# Patient Record
Sex: FEMALE | Race: WHITE | NOT HISPANIC OR LATINO | Employment: FULL TIME | URBAN - METROPOLITAN AREA
[De-identification: names, ages, dates, MRNs, and addresses within clinical notes are randomized per-mention and may not be internally consistent; named-entity substitution may affect disease eponyms.]

---

## 2018-07-31 ENCOUNTER — HOSPITAL ENCOUNTER (INPATIENT)
Facility: HOSPITAL | Age: 28
LOS: 3 days | Discharge: HOME | DRG: 564 | End: 2018-08-03
Attending: EMERGENCY MEDICINE | Admitting: INTERNAL MEDICINE
Payer: COMMERCIAL

## 2018-07-31 ENCOUNTER — APPOINTMENT (EMERGENCY)
Dept: ULTRASOUND IMAGING | Facility: HOSPITAL | Age: 28
DRG: 564 | End: 2018-07-31
Payer: COMMERCIAL

## 2018-07-31 ENCOUNTER — HOSPITAL ENCOUNTER (EMERGENCY)
Facility: HOSPITAL | Age: 28
Discharge: HOME | DRG: 564 | End: 2018-07-31
Attending: EMERGENCY MEDICINE
Payer: COMMERCIAL

## 2018-07-31 VITALS
HEART RATE: 101 BPM | WEIGHT: 138.67 LBS | HEIGHT: 64 IN | TEMPERATURE: 98.4 F | BODY MASS INDEX: 23.67 KG/M2 | RESPIRATION RATE: 18 BRPM | OXYGEN SATURATION: 99 % | SYSTOLIC BLOOD PRESSURE: 121 MMHG | DIASTOLIC BLOOD PRESSURE: 70 MMHG

## 2018-07-31 DIAGNOSIS — E11.9 DIABETES MELLITUS, NEW ONSET (HCC): Primary | ICD-10-CM

## 2018-07-31 DIAGNOSIS — O03.9 MISCARRIAGE: ICD-10-CM

## 2018-07-31 DIAGNOSIS — O46.90 VAGINAL BLEEDING IN PREGNANCY: Primary | ICD-10-CM

## 2018-07-31 DIAGNOSIS — G43.909 MIGRAINE: ICD-10-CM

## 2018-07-31 LAB
ABO GROUP BLD: NORMAL
ACETONE SERPL-MCNC: NEGATIVE MG/DL
ANION GAP SERPL CALCULATED.3IONS-SCNC: 9 MMOL/L (ref 4–13)
B-HCG SERPL-ACNC: 35 MIU/ML
BACTERIA UR QL AUTO: ABNORMAL /HPF
BASOPHILS # BLD AUTO: 0.06 THOUSANDS/ΜL (ref 0–0.1)
BASOPHILS NFR BLD AUTO: 1 % (ref 0–1)
BILIRUB UR QL STRIP: NEGATIVE
BLD GP AB SCN SERPL QL: NEGATIVE
BUN SERPL-MCNC: 11 MG/DL (ref 5–25)
CALCIUM SERPL-MCNC: 8.8 MG/DL (ref 8.3–10.1)
CHLORIDE SERPL-SCNC: 94 MMOL/L (ref 100–108)
CLARITY UR: ABNORMAL
CO2 SERPL-SCNC: 25 MMOL/L (ref 21–32)
COLOR UR: YELLOW
CREAT SERPL-MCNC: 0.9 MG/DL (ref 0.6–1.3)
EOSINOPHIL # BLD AUTO: 0.39 THOUSAND/ΜL (ref 0–0.61)
EOSINOPHIL NFR BLD AUTO: 4 % (ref 0–6)
ERYTHROCYTE [DISTWIDTH] IN BLOOD BY AUTOMATED COUNT: 12 % (ref 11.6–15.1)
GFR SERPL CREATININE-BSD FRML MDRD: 89 ML/MIN/1.73SQ M
GLUCOSE SERPL-MCNC: 627 MG/DL (ref 65–140)
GLUCOSE UR STRIP-MCNC: ABNORMAL MG/DL
HCT VFR BLD AUTO: 36.6 % (ref 34.8–46.1)
HGB BLD-MCNC: 12.5 G/DL (ref 11.5–15.4)
HGB UR QL STRIP.AUTO: ABNORMAL
IMM GRANULOCYTES # BLD AUTO: 0.04 THOUSAND/UL (ref 0–0.2)
IMM GRANULOCYTES NFR BLD AUTO: 0 % (ref 0–2)
KETONES UR STRIP-MCNC: NEGATIVE MG/DL
LEUKOCYTE ESTERASE UR QL STRIP: ABNORMAL
LYMPHOCYTES # BLD AUTO: 3.81 THOUSANDS/ΜL (ref 0.6–4.47)
LYMPHOCYTES NFR BLD AUTO: 41 % (ref 14–44)
MCH RBC QN AUTO: 30 PG (ref 26.8–34.3)
MCHC RBC AUTO-ENTMCNC: 34.2 G/DL (ref 31.4–37.4)
MCV RBC AUTO: 88 FL (ref 82–98)
MONOCYTES # BLD AUTO: 0.74 THOUSAND/ΜL (ref 0.17–1.22)
MONOCYTES NFR BLD AUTO: 8 % (ref 4–12)
NEUTROPHILS # BLD AUTO: 4.24 THOUSANDS/ΜL (ref 1.85–7.62)
NEUTS SEG NFR BLD AUTO: 46 % (ref 43–75)
NITRITE UR QL STRIP: NEGATIVE
NON-SQ EPI CELLS URNS QL MICRO: ABNORMAL /HPF
NRBC BLD AUTO-RTO: 0 /100 WBCS
PH UR STRIP.AUTO: 6 [PH] (ref 4.5–8)
PLATELET # BLD AUTO: 357 THOUSANDS/UL (ref 149–390)
PMV BLD AUTO: 9.6 FL (ref 8.9–12.7)
POTASSIUM SERPL-SCNC: 3.8 MMOL/L (ref 3.5–5.3)
PROT UR STRIP-MCNC: NEGATIVE MG/DL
RBC # BLD AUTO: 4.16 MILLION/UL (ref 3.81–5.12)
RBC #/AREA URNS AUTO: ABNORMAL /HPF
RH BLD: POSITIVE
SODIUM SERPL-SCNC: 128 MMOL/L (ref 136–145)
SP GR UR STRIP.AUTO: <=1.005 (ref 1–1.03)
SPECIMEN EXPIRATION DATE: NORMAL
UROBILINOGEN UR QL STRIP.AUTO: 0.2 E.U./DL
WBC # BLD AUTO: 9.28 THOUSAND/UL (ref 4.31–10.16)
WBC #/AREA URNS AUTO: ABNORMAL /HPF

## 2018-07-31 PROCEDURE — 82009 KETONE BODYS QUAL: CPT | Performed by: EMERGENCY MEDICINE

## 2018-07-31 PROCEDURE — 76815 OB US LIMITED FETUS(S): CPT

## 2018-07-31 PROCEDURE — 84300 ASSAY OF URINE SODIUM: CPT | Performed by: INTERNAL MEDICINE

## 2018-07-31 PROCEDURE — 80048 BASIC METABOLIC PNL TOTAL CA: CPT | Performed by: EMERGENCY MEDICINE

## 2018-07-31 PROCEDURE — 96360 HYDRATION IV INFUSION INIT: CPT

## 2018-07-31 PROCEDURE — 81001 URINALYSIS AUTO W/SCOPE: CPT | Performed by: EMERGENCY MEDICINE

## 2018-07-31 PROCEDURE — 99284 EMERGENCY DEPT VISIT MOD MDM: CPT

## 2018-07-31 PROCEDURE — 84702 CHORIONIC GONADOTROPIN TEST: CPT | Performed by: EMERGENCY MEDICINE

## 2018-07-31 PROCEDURE — 86850 RBC ANTIBODY SCREEN: CPT | Performed by: EMERGENCY MEDICINE

## 2018-07-31 PROCEDURE — 36415 COLL VENOUS BLD VENIPUNCTURE: CPT | Performed by: EMERGENCY MEDICINE

## 2018-07-31 PROCEDURE — 86901 BLOOD TYPING SEROLOGIC RH(D): CPT | Performed by: EMERGENCY MEDICINE

## 2018-07-31 PROCEDURE — 85025 COMPLETE CBC W/AUTO DIFF WBC: CPT | Performed by: EMERGENCY MEDICINE

## 2018-07-31 PROCEDURE — 83036 HEMOGLOBIN GLYCOSYLATED A1C: CPT | Performed by: EMERGENCY MEDICINE

## 2018-07-31 PROCEDURE — 86900 BLOOD TYPING SEROLOGIC ABO: CPT | Performed by: EMERGENCY MEDICINE

## 2018-07-31 PROCEDURE — 83935 ASSAY OF URINE OSMOLALITY: CPT | Performed by: INTERNAL MEDICINE

## 2018-07-31 RX ORDER — BUTALBITAL, ACETAMINOPHEN AND CAFFEINE 50; 325; 40 MG/1; MG/1; MG/1
1 TABLET ORAL ONCE
Status: COMPLETED | OUTPATIENT
Start: 2018-07-31 | End: 2018-07-31

## 2018-07-31 RX ADMIN — SODIUM CHLORIDE 1000 ML: 0.9 INJECTION, SOLUTION INTRAVENOUS at 23:09

## 2018-07-31 RX ADMIN — BUTALBITAL, ACETAMINOPHEN, AND CAFFEINE 1 TABLET: 50; 325; 40 TABLET ORAL at 23:41

## 2018-07-31 NOTE — ED PROVIDER NOTES
History  Chief Complaint   Patient presents with    Vaginal Bleeding     Pt  states she is 7 weeks pregnant and noticed bleeding and abdominal cramping since yesterday  pt states her last pregnancy she miscarried at 16 weeks     HPI  73-year-old  approximately 7 weeks pregnant by LMP with history of PCOS and metabolic syndrome presents to the emergency department with complaint of vaginal bleeding  Patient states that she began having lower abdominal cramping and vaginal bleeding last night  Symptoms continued throughout the day today and patient now reports bleeding as heavy as a typical menstrual period with associated abdominal cramping  Patient reports having had many positive pregnancy tests since last week  She has a first pregnancy appointment with an OB next week, but has not yet had a confirmed IUP  Of note, patient did have a miscarriage in May 2017 at 12 weeks  She did reports that she was able to pass the pregnancy during the miscarriage without intervention  On review of systems, she complains of recent nausea and vomiting over the past few weeks, but denies any fever, chills, chest pain, shortness of breath, or complaints other than stated above  None       Past Medical History:   Diagnosis Date    Asthma     Diabetes mellitus ([facility] [address] )     Metabolic syndrome     PCOS (polycystic ovarian syndrome)        History reviewed  No pertinent surgical history  History reviewed  No pertinent family history  I have reviewed and agree with the history as documented  Social History   Substance Use Topics    Smoking status: Never Smoker    Smokeless tobacco: Never Used    Alcohol use No        Review of Systems   Constitutional: Negative for chills and fever  Respiratory: Negative for shortness of breath  Gastrointestinal: Positive for abdominal pain  Negative for nausea and vomiting  Genitourinary: Positive for vaginal bleeding     Musculoskeletal: Negative for arthralgias and joint swelling  Skin: Negative for rash and wound  Allergic/Immunologic: Negative for immunocompromised state  Neurological: Negative for headaches  Psychiatric/Behavioral: The patient is not nervous/anxious  All other systems reviewed and are negative  Physical Exam  Physical Exam   Constitutional: She is oriented to person, place, and time  She appears well-nourished  No distress  HENT:   Head: Normocephalic and atraumatic  Eyes: EOM are normal    Neck: Normal range of motion  Neck supple  Cardiovascular: Normal rate and regular rhythm  Pulmonary/Chest: Effort normal and breath sounds normal  No respiratory distress  Abdominal: Soft  She exhibits no distension  Fluid wave: mild  There is tenderness in the suprapubic area  There is no rigidity and no guarding  Musculoskeletal: Normal range of motion  Neurological: She is alert and oriented to person, place, and time  Skin: Skin is warm and dry  She is not diaphoretic  Psychiatric: She has a normal mood and affect  Her behavior is normal    Nursing note and vitals reviewed        Vital Signs  ED Triage Vitals [07/31/18 1536]   Temperature Pulse Respirations Blood Pressure SpO2   98 4 °F (36 9 °C) (!) 107 16 125/80 99 %      Temp Source Heart Rate Source Patient Position - Orthostatic VS BP Location FiO2 (%)   Oral Monitor Sitting Left arm --      Pain Score       4           Vitals:    07/31/18 1536 07/31/18 1755   BP: 125/80 121/70   Pulse: (!) 107 101   Patient Position - Orthostatic VS: Sitting Sitting       Visual Acuity      ED Medications  Medications - No data to display    Diagnostic Studies  Results Reviewed     Procedure Component Value Units Date/Time    Basic metabolic panel [[id number]]  (Abnormal) Collected:  07/31/18 1623    Lab Status:  Final result Specimen:  Blood from Arm, Right Updated:  07/31/18 1934     Sodium 128 (L) mmol/L      Potassium 3 8 mmol/L      Chloride 94 (L) mmol/L      CO2 25 mmol/L      Anion Gap 9 mmol/L      BUN 11 mg/dL      Creatinine 0 90 mg/dL      Glucose 627 (HH) mg/dL      Calcium 8 8 mg/dL      eGFR 89 ml/min/1 73sq m     Narrative:         National Kidney Disease Education Program recommendations are as follows:  GFR calculation is accurate only with a steady state creatinine  Chronic Kidney disease less than 60 ml/min/1 73 sq  meters  Kidney failure less than 15 ml/min/1 73 sq  meters  Urinalysis with microscopic [[id number]]  (Abnormal) Collected:  07/31/18 1741    Lab Status:  Final result Specimen:  Urine from Urine, Clean Catch Updated:  07/31/18 1755     Clarity, UA Slightly Cloudy     Color, UA Yellow     Specific Gravity, UA <=1 005     pH, UA 6 0     Glucose, UA >=1000 (1%) (A) mg/dl      Ketones, UA Negative mg/dl      Blood, UA Large (A)     Protein, UA Negative mg/dl      Nitrite, UA Negative     Bilirubin, UA Negative     Urobilinogen, UA 0 2 E U /dl      Leukocytes, UA Elevated glucose may cause decreased leukocyte values   See urine microscopic for [facility] result/ (A)     WBC, UA None Seen /hpf      RBC, UA 30-50 (A) /hpf      Bacteria, UA Occasional /hpf      Epithelial Cells Occasional /hpf     hCG, quantitative [[id number]]  (Abnormal) Collected:  07/31/18 1623    Lab Status:  Final result Specimen:  Blood from Arm, Right Updated:  07/31/18 1652     HCG, Quant 35 (H) mIU/mL     Narrative:          Expected Ranges:     Approximate               Approximate HCG  Gestation age          Concentration ( mIU/mL)  _____________          ______________________   Brooks Nurse                      HCG values  0 2-1                       5-50  1-2                           2-3                         100-5000  3-4                         500-52012  4-5                         1000-82301  5-6                         [id number]  6-8                         [id number]  8-12                        [id number]                 US OB pregnancy limited with transvaginal   Final Result by Janet Nuñez MD ( 1712)      No intrauterine gestation or adnexal mass identified  Differential remains early IUP, spontaneous  and ectopic pregnancy  Short-term follow-up with serial beta-hCG and pelvic/OB ultrasound in 1 to 2 weeks  Workstation performed: [id number]                    Procedures  Procedures       Phone Contacts  ED Phone Contact    ED Course                               MDM  Number of Diagnoses or Management Options  Vaginal bleeding in pregnancy:   Diagnosis management comments: 80-year-old female with vaginal bleeding, recent positive pregnancy tests home, though no confirmed IUP  Will obtain hCG quant, type and screen, UA, and formal ultrasound, as no IUP visible on bedside ultrasound  Of note, patient discharged home after return of ultrasound revealing likely miscarriage  Patient's urine was sent to the lab just before discharge  Patient was called regarding abnormal UA with glucose in her urine  BMP was added on to blood already sent down to lab  Glucose noted to be over 600  Patient called back to the emergency department for further workup  (See next note)  CritCare Time    Disposition  Final diagnoses:   Vaginal bleeding in pregnancy     Time reflects when diagnosis was documented in both MDM as applicable and the Disposition within this note     Time User Action Codes Description Comment    2018  5:41 PM Demetra Kimball Add [O46 90] Vaginal bleeding in pregnancy       ED Disposition     ED Disposition Condition Comment    Discharge  Ellis Cook discharge to home/self care      Condition at discharge: Good        Follow-up Information     Follow up With Specialties Details Why Carlos Mann MD Maternal and Fetal Medicine, Obstetrics, Obstetrics and Gynecology, Gynecology Schedule an appointment as soon as possible for a visit  [address] [place] [address] [phone]      [place] [address] [facility] Obstetrics and Gynecology Schedule an appointment as soon as possible for a visit  [address] [place] [address]  [phone]          There are no discharge medications for this patient  Outpatient Discharge Orders  hCG, quantitative   Standing Status: Future  Standing Exp  Date: 07/31/19     Hemoglobin A1C   Standing Status: Future  Standing Exp  Date: 07/31/19     Basic metabolic panel   Standing Status: Future Number of Occurrences: 1 Standing Exp   Date: 07/31/19         ED Provider  Electronically Signed by           Caio Trejo MD  08/01/18 0670

## 2018-07-31 NOTE — DISCHARGE INSTRUCTIONS
Threatened Miscarriage   WHAT YOU NEED TO KNOW:   A threatened miscarriage occurs when you have vaginal bleeding within the first 20 weeks of pregnancy  It means that a miscarriage may happen  A threatened miscarriage may also be called a threatened   DISCHARGE INSTRUCTIONS:   Return to the emergency department if:   · You feel weak or faint  · Your pain or cramping in your abdomen or back gets worse  · You have vaginal bleeding that soaks 1 or more pads in an hour  · You pass material that looks like tissue or large clots  Contact your healthcare provider or obstetrician if:   · You have a fever  · You have trouble urinating, burning when you urinate, or feel a need to urinate often  · You have new or worsening vaginal bleeding  · You have vaginal pain or itching, or vaginal discharge that is yellow, green, or foul-smelling  · You have questions or concerns about your condition or care  Self-care: The following may help you manage your symptoms and decrease your risk for a miscarriage:  · Do not put anything in your vagina  Do not have sex, douche, or use tampons  These actions may increase your risk for infection and miscarriage  · Rest as directed  Do not exercise or do strenuous activities  These activities may cause  labor or miscarriage  Ask your healthcare provider what activities are okay to do  Stay healthy during pregnancy:   · Eat a variety of healthy foods  Healthy foods can help you get extra protein, water, and calories that you need while you are pregnant  Healthy foods include fruits, vegetables, whole-grain breads, low-fat dairy products, beans, lean meats, and fish  Avoid raw or undercooked meat and fish  Ask your healthcare provider if you need a special diet  · Take prenatal vitamins as directed  These help you get the right amount of vitamins and minerals  They may also decrease the risk of certain birth defects      · Do not drink alcohol or use illegal drugs  These can increase your risk for a miscarriage or harm your baby  · Do not smoke  Nicotine and other chemicals in cigarettes and cigars can harm your baby and cause miscarriage or  labor  Ask your healthcare provider for information if you currently smoke and need help to quit  E-cigarettes or smokeless tobacco still contain nicotine  Do not use these products  · Decrease your risk for an infection  Always wash your hands before eating or preparing meals  Do not spend time with people who are sick  Ask your healthcare provider if you need immunizations such as the flu or hepatitis B vaccine  Immunizations may decrease your risk for infections that could cause a miscarriage  · Manage your medical conditions  Keep your blood pressure and blood sugars under control  Maintain a healthy weight during pregnancy  Follow up with your obstetrician as directed: You may need to see your obstetrician frequently for ultrasounds or blood tests  Write down your questions so you remember to ask them during your visits  ©  26[address] [employer] Information is for End User's use only and may not be sold, redistributed or otherwise used for commercial purposes  All illustrations and images included in CareNotes® are the copyrighted property of [employer]  or Sorin Sadler  The above information is an  only  It is not intended as medical advice for individual conditions or treatments  Talk to your doctor, nurse or pharmacist before following any medical regimen to see if it is safe and effective for you

## 2018-08-01 PROBLEM — E11.9 DIABETES MELLITUS, NEW ONSET (HCC): Status: ACTIVE | Noted: 2018-08-01

## 2018-08-01 PROBLEM — O03.9 MISCARRIAGE: Status: ACTIVE | Noted: 2018-08-01

## 2018-08-01 PROBLEM — E87.1 HYPONATREMIA: Status: ACTIVE | Noted: 2018-08-01

## 2018-08-01 LAB
ALBUMIN SERPL BCP-MCNC: 3.1 G/DL (ref 3.5–5)
ALP SERPL-CCNC: 60 U/L (ref 46–116)
ALT SERPL W P-5'-P-CCNC: 11 U/L (ref 12–78)
ANION GAP SERPL CALCULATED.3IONS-SCNC: 6 MMOL/L (ref 4–13)
ANION GAP SERPL CALCULATED.3IONS-SCNC: 6 MMOL/L (ref 4–13)
AST SERPL W P-5'-P-CCNC: 5 U/L (ref 5–45)
BILIRUB SERPL-MCNC: 0.2 MG/DL (ref 0.2–1)
BUN SERPL-MCNC: 12 MG/DL (ref 5–25)
BUN SERPL-MCNC: 12 MG/DL (ref 5–25)
CALCIUM SERPL-MCNC: 8.7 MG/DL (ref 8.3–10.1)
CALCIUM SERPL-MCNC: 9 MG/DL (ref 8.3–10.1)
CHLORIDE SERPL-SCNC: 94 MMOL/L (ref 100–108)
CHLORIDE SERPL-SCNC: 99 MMOL/L (ref 100–108)
CHOLEST SERPL-MCNC: 168 MG/DL (ref 50–200)
CO2 SERPL-SCNC: 28 MMOL/L (ref 21–32)
CO2 SERPL-SCNC: 28 MMOL/L (ref 21–32)
CREAT SERPL-MCNC: 0.67 MG/DL (ref 0.6–1.3)
CREAT SERPL-MCNC: 0.76 MG/DL (ref 0.6–1.3)
ERYTHROCYTE [DISTWIDTH] IN BLOOD BY AUTOMATED COUNT: 11.8 % (ref 11.6–15.1)
EST. AVERAGE GLUCOSE BLD GHB EST-MCNC: 352 MG/DL
GFR SERPL CREATININE-BSD FRML MDRD: 109 ML/MIN/1.73SQ M
GFR SERPL CREATININE-BSD FRML MDRD: 122 ML/MIN/1.73SQ M
GLUCOSE SERPL-MCNC: 312 MG/DL (ref 65–140)
GLUCOSE SERPL-MCNC: 324 MG/DL (ref 65–140)
GLUCOSE SERPL-MCNC: 341 MG/DL (ref 65–140)
GLUCOSE SERPL-MCNC: 348 MG/DL (ref 65–140)
GLUCOSE SERPL-MCNC: 441 MG/DL (ref 65–140)
GLUCOSE SERPL-MCNC: 468 MG/DL (ref 65–140)
GLUCOSE SERPL-MCNC: 515 MG/DL (ref 65–140)
HBA1C MFR BLD: 13.9 % (ref 4.2–6.3)
HCT VFR BLD AUTO: 35 % (ref 34.8–46.1)
HDLC SERPL-MCNC: 61 MG/DL (ref 40–60)
HGB BLD-MCNC: 12 G/DL (ref 11.5–15.4)
LDLC SERPL CALC-MCNC: 96 MG/DL (ref 0–100)
MAGNESIUM SERPL-MCNC: 1.5 MG/DL (ref 1.6–2.6)
MCH RBC QN AUTO: 29.9 PG (ref 26.8–34.3)
MCHC RBC AUTO-ENTMCNC: 34.3 G/DL (ref 31.4–37.4)
MCV RBC AUTO: 87 FL (ref 82–98)
OSMOLALITY UR/SERPL-RTO: 292 MMOL/KG (ref 282–298)
OSMOLALITY UR: 652 MMOL/KG
PHOSPHATE SERPL-MCNC: 3.4 MG/DL (ref 2.7–4.5)
PLATELET # BLD AUTO: 343 THOUSANDS/UL (ref 149–390)
PMV BLD AUTO: 9.3 FL (ref 8.9–12.7)
POTASSIUM SERPL-SCNC: 3.5 MMOL/L (ref 3.5–5.3)
POTASSIUM SERPL-SCNC: 3.8 MMOL/L (ref 3.5–5.3)
PROT SERPL-MCNC: 6.5 G/DL (ref 6.4–8.2)
RBC # BLD AUTO: 4.02 MILLION/UL (ref 3.81–5.12)
SODIUM 24H UR-SCNC: 23 MOL/L
SODIUM SERPL-SCNC: 128 MMOL/L (ref 136–145)
SODIUM SERPL-SCNC: 133 MMOL/L (ref 136–145)
SODIUM SERPL-SCNC: 133 MMOL/L (ref 136–145)
TRIGL SERPL-MCNC: 54 MG/DL
TSH SERPL DL<=0.05 MIU/L-ACNC: 3.34 UIU/ML (ref 0.36–3.74)
WBC # BLD AUTO: 9.15 THOUSAND/UL (ref 4.31–10.16)

## 2018-08-01 PROCEDURE — 84100 ASSAY OF PHOSPHORUS: CPT | Performed by: INTERNAL MEDICINE

## 2018-08-01 PROCEDURE — 99284 EMERGENCY DEPT VISIT MOD MDM: CPT

## 2018-08-01 PROCEDURE — 85027 COMPLETE CBC AUTOMATED: CPT | Performed by: INTERNAL MEDICINE

## 2018-08-01 PROCEDURE — 36415 COLL VENOUS BLD VENIPUNCTURE: CPT | Performed by: EMERGENCY MEDICINE

## 2018-08-01 PROCEDURE — 82948 REAGENT STRIP/BLOOD GLUCOSE: CPT

## 2018-08-01 PROCEDURE — 99253 IP/OBS CNSLTJ NEW/EST LOW 45: CPT | Performed by: OBSTETRICS & GYNECOLOGY

## 2018-08-01 PROCEDURE — 84443 ASSAY THYROID STIM HORMONE: CPT | Performed by: INTERNAL MEDICINE

## 2018-08-01 PROCEDURE — 84295 ASSAY OF SERUM SODIUM: CPT | Performed by: INTERNAL MEDICINE

## 2018-08-01 PROCEDURE — 80048 BASIC METABOLIC PNL TOTAL CA: CPT | Performed by: EMERGENCY MEDICINE

## 2018-08-01 PROCEDURE — 99223 1ST HOSP IP/OBS HIGH 75: CPT | Performed by: INTERNAL MEDICINE

## 2018-08-01 PROCEDURE — 80053 COMPREHEN METABOLIC PANEL: CPT | Performed by: INTERNAL MEDICINE

## 2018-08-01 PROCEDURE — 83735 ASSAY OF MAGNESIUM: CPT | Performed by: INTERNAL MEDICINE

## 2018-08-01 PROCEDURE — 83930 ASSAY OF BLOOD OSMOLALITY: CPT | Performed by: INTERNAL MEDICINE

## 2018-08-01 PROCEDURE — 80061 LIPID PANEL: CPT | Performed by: PHYSICIAN ASSISTANT

## 2018-08-01 RX ORDER — MAGNESIUM SULFATE HEPTAHYDRATE 40 MG/ML
2 INJECTION, SOLUTION INTRAVENOUS ONCE
Status: COMPLETED | OUTPATIENT
Start: 2018-08-01 | End: 2018-08-02

## 2018-08-01 RX ORDER — ONDANSETRON 2 MG/ML
4 INJECTION INTRAMUSCULAR; INTRAVENOUS EVERY 6 HOURS PRN
Status: DISCONTINUED | OUTPATIENT
Start: 2018-08-01 | End: 2018-08-03 | Stop reason: HOSPADM

## 2018-08-01 RX ORDER — SODIUM CHLORIDE 9 MG/ML
125 INJECTION, SOLUTION INTRAVENOUS CONTINUOUS
Status: DISCONTINUED | OUTPATIENT
Start: 2018-08-01 | End: 2018-08-02

## 2018-08-01 RX ORDER — BUTALBITAL, ACETAMINOPHEN AND CAFFEINE 50; 325; 40 MG/1; MG/1; MG/1
1 TABLET ORAL ONCE
Status: COMPLETED | OUTPATIENT
Start: 2018-08-01 | End: 2018-08-01

## 2018-08-01 RX ORDER — POTASSIUM CHLORIDE 14.9 MG/ML
20 INJECTION INTRAVENOUS ONCE
Status: COMPLETED | OUTPATIENT
Start: 2018-08-01 | End: 2018-08-02

## 2018-08-01 RX ORDER — INSULIN GLARGINE 100 [IU]/ML
10 INJECTION, SOLUTION SUBCUTANEOUS
Status: DISCONTINUED | OUTPATIENT
Start: 2018-08-01 | End: 2018-08-02

## 2018-08-01 RX ORDER — ACETAMINOPHEN 325 MG/1
650 TABLET ORAL EVERY 6 HOURS PRN
Status: DISCONTINUED | OUTPATIENT
Start: 2018-08-01 | End: 2018-08-03 | Stop reason: HOSPADM

## 2018-08-01 RX ADMIN — POTASSIUM CHLORIDE 20 MEQ: 200 INJECTION, SOLUTION INTRAVENOUS at 02:25

## 2018-08-01 RX ADMIN — ACETAMINOPHEN 650 MG: 325 TABLET, FILM COATED ORAL at 16:57

## 2018-08-01 RX ADMIN — INSULIN LISPRO 5 UNITS: 100 INJECTION, SOLUTION INTRAVENOUS; SUBCUTANEOUS at 21:16

## 2018-08-01 RX ADMIN — INSULIN LISPRO 3 UNITS: 100 INJECTION, SOLUTION INTRAVENOUS; SUBCUTANEOUS at 11:11

## 2018-08-01 RX ADMIN — INSULIN LISPRO 3 UNITS: 100 INJECTION, SOLUTION INTRAVENOUS; SUBCUTANEOUS at 08:24

## 2018-08-01 RX ADMIN — ACETAMINOPHEN 650 MG: 325 TABLET, FILM COATED ORAL at 02:24

## 2018-08-01 RX ADMIN — INSULIN LISPRO 4 UNITS: 100 INJECTION, SOLUTION INTRAVENOUS; SUBCUTANEOUS at 16:58

## 2018-08-01 RX ADMIN — BUTALBITAL, ACETAMINOPHEN, AND CAFFEINE 1 TABLET: 50; 325; 40 TABLET ORAL at 21:10

## 2018-08-01 RX ADMIN — SODIUM CHLORIDE 125 ML/HR: 0.9 INJECTION, SOLUTION INTRAVENOUS at 15:10

## 2018-08-01 RX ADMIN — INSULIN HUMAN 10 UNITS: 100 INJECTION, SOLUTION PARENTERAL at 02:25

## 2018-08-01 RX ADMIN — SODIUM CHLORIDE 125 ML/HR: 0.9 INJECTION, SOLUTION INTRAVENOUS at 02:36

## 2018-08-01 RX ADMIN — MAGNESIUM SULFATE HEPTAHYDRATE 2 G: 40 INJECTION, SOLUTION INTRAVENOUS at 16:05

## 2018-08-01 RX ADMIN — INSULIN GLARGINE 10 UNITS: 100 INJECTION, SOLUTION SUBCUTANEOUS at 21:12

## 2018-08-01 NOTE — NURSING NOTE
Patient admitted through the ER with hyperglycemia  Patient miscarried earlier today  She was seen in the ED and sent home initially and called back in when her serum glucose came back at 627  Rechecked in the ER at 515  She was given 10 units of regular insulin SQ  Patient is resting at this time

## 2018-08-01 NOTE — CASE MANAGEMENT
Initial Clinical Review    Admission: Date/Time/Statement: 7/31/18 @ [address] Inpatient Admission (expected length of stay for this patient is greater than two midnights)     Standing Status:   Standing     Number of Occurrences:   1     Order Specific Question:   Admitting Physician     Answer:   Keyana Willis     Order Specific Question:   Level of Care     Answer:   Med Surg [16]     Order Specific Question:   Estimated length of stay     Answer:   More than 2 Midnights     Order Specific Question:   Certification     Answer:   I certify that inpatient services are medically necessary for this patient for a duration of greater than two midnights  See H&P and MD Progress Notes for additional information about the patient's course of treatment  ED: Date/Time/Mode of Arrival:   ED Arrival Information     Expected Arrival Acuity Means of Arrival Escorted By Service Admission Type    - 7/31/2018 22:10 Urgent Walk-In Family Member General Medicine Urgent    Arrival Complaint    hyperglycemia - admit        Chief Complaint:   Chief Complaint   Patient presents with    Abnormal Lab     Patient seen earlier for miscarriage  Patient called back by Dr Enrike Saha for elevated blood glucose  History of Illness:     Paulina Tabor is a 34 y o  female who initially presented to the ER yesterday evening complaining of vaginal bleeding and passing of a large clot   She notes that she has been pregnant over the last several weeks which she confirm with pregnancy testing and past a similar clot with associated cramping last night  In the ER, a quantitative HCG was low at 35 for being approximately six seven weeks pregnant  A follow-up transvaginal ultrasound was negative for evidence of intrauterine gestation or adnexal mass  She was initially discharged from the hospital later yesterday evening but then told to come back as her blood work revealed an elevated blood sugar of 627    She has no prior history of diabetes mellitus but in the past was on a trial of Metformin for PCOS  She states that her vision has been blurry over the last week or so  ED Vital Signs:   ED Triage Vitals [07/31/18 2238]   Temperature Pulse Respirations Blood Pressure SpO2   98 2 °F (36 8 °C) 88 16 134/85 100 %   Pain Score       7        Wt Readings from Last 1 Encounters:   08/01/18 56 7 kg (125 lb)       Vital Signs: WNL    Abnormal Labs/Diagnostic Test Results:     07/31/18 1623     Sodium 136 - 145 mmol/L 128     Potassium 3 5 - 5 3 mmol/L 3 8    Chloride 100 - 108 mmol/L 94     CO2 21 - 32 mmol/L 25    Anion Gap 4 - 13 mmol/L 9    BUN 5 - 25 mg/dL 11    Creatinine 0 60 - 1 30 mg/dL 0 90    Comment: Standardized to IDMS reference method   Glucose 65 - 140 mg/dL 627       HbA1C = 13 9  Serum osmolality = 292      07/31/18 1741     Clarity, UA  Slightly Cloudy    Color, UA  Yellow    Specific [place], UA 1 003 - 1 030 <=1 005    pH, UA 4 5 - 8 0 6 0    Glucose, UA Negative mg/dl >=1000 (1%)     Ketones, UA Negative mg/dl Negative    Blood, UA Negative Large     Protein, UA Negative mg/dl Negative    Nitrite, UA Negative Negative    Bilirubin, UA Negative Negative    Urobilinogen, UA 0 2, 1 0 E U /dl E U /dl 0 2    Leukocytes, UA Negative Elevated glucose may cause decreased leukocyt        WBC, UA None Seen, 0-5, 5-55, 5-65 /hpf None Seen    RBC, UA None Seen, 0-5 /hpf 30-50     Bacteria, UA None Seen, Occasional /hpf Occasional    Epithelial Cells None Seen, Occasional /hpf Occasional      ED Treatment:   Medication Administration from 07/31/2018 2210 to 08/01/2018 0050       Date/Time Order Dose Route Action     07/31/2018 2309 sodium chloride 0 9 % bolus 1,000 mL 1,000 mL Intravenous New Bag     07/31/2018 2341 butalbital-acetaminophen-caffeine (FIORICET,ESGIC) -40 mg per tablet 1 tablet 1 tablet Oral Given          Past Medical/Surgical History:     Past Medical History:   Diagnosis Date    Asthma     Diabetes mellitus ([facility] )     Metabolic syndrome     PCOS (polycystic ovarian syndrome)        Admitting Diagnosis: Hyperglycemia [R73 9]  Diabetes mellitus, new onset ([facility] ) [E11 9]    Age/Sex: 34 y o  female    Assessment/Plan:   Hyperglycemia - New onset diabetes mellitus   Assessment & Plan     - check HbA1c   - ordered 10 units of regular insulin IV - placed on SSI coverage per [employer]ks - continue aggressive IV fluid hydration  - will appreciate endocrinology input  - recalls being on Metformin approximately three years ago for unrelated PCOS which she took herself off of due to intolerability       Hyponatremia   Assessment & Plan     - likely pseudohyponatremia from significant hyperglycemia - continue IV fluids and insulin for elevated blood sugars and monitor serum sodium levels   - will check serum osmolality, urine osmolality, and urine sodium to assess for possibility of alternate etiology       Miscarriage   Assessment & Plan     - note similar episode a year ago with confirmed miscarriage - states she passed a large blood clot a few days ago while attempting to urinate and noted that her belly distention over the last several weeks along with breast size have decreased - she noted her symptoms were similar to her previous episode as aforementioned  - a quantitative HCG in the ER was only mildly elevated at 35 (low for being pregnant for several weeks already)   - transvaginal ultrasound today was negative for intrauterine gestation or adnexal mass the patient denies any abdominal/suprapubic pain at this time  - supportive care offered         Admission Orders:    Endocrinology consult, Gynecology consult, sequential compression device, sodium Q4H    Scheduled Meds:   Current Facility-Administered Medications:  acetaminophen 650 mg Oral Q6H PRN   insulin lispro 1-5 Units Subcutaneous 4x Daily (AC & HS)   ondansetron 4 mg Intravenous Q6H PRN     Continuous Infusions:   sodium chloride 125 mL/hr Thank you,  Kylie Aqq  [address] Utilization Review Department  Phone: [phone]; Fax [phone]  ATTENTION: The Network Utilization Review Department is now centralized for our 9 Facilities  Make a note that we have a new phone and fax numbers for our Department  Please call with any questions or concerns to [phone] and carefully follow the prompts so that you are directed to the right person  All voicemails are confidential  Fax any determinations, approvals, denials, and requests for initial or continue stay review clinical to [phone]  Due to HIGH CALL volume, it would be easier if you could please send faxed requests to expedite your requests and in part, help us provide discharge notifications faster

## 2018-08-01 NOTE — CONSULTS
Consult - Gynecology   Maureen Liriano 34 y o  female MRN: [id number]  Unit/Bed#: -[facility] Encounter: [id number]    Assessment/Plan     Assessment:  34year old G2P 0 0 2 0  Spontaneous Miscarriage      Inpatient consult to Obstetrics / Gynecology  Consult performed by: Brice Tompkins ordered by: Keke Hodges          Plan:  1  Spontaneous miscarriage - expect bleeding to continue for 1-6 weeks with cramping  Will follow HCG to verify resolution  Blood type is A Positive Rhogam not indicated  Plan followup outpatient 1-2 weeks  2  Contraception - due to new diagnosis of DM would recommend no pregnancy attempts until blood sugars are under control  Pregnancies conceived with elevated HgA1C have higher risk of miscarriage, birth defects, stillbirth, maternal morbidity  Discussed multiple contraception options will start OC upon discharge from the hospital  Santos Valdes added to discharge meds on med rec form  Can start immediately do not wait for onset of next menses  Takes 4-6 weeks to fully take effect recommend either abstinence or condoms until this time  History of Present Illness   HPI:  Maureen Liriano is a 34 y o  female who presents with spontaneous miscarriage  Upon further evaluation she is found to have uncontrolled undiagnosed diabetes  34year old G2 P 0 0 2 0 with long term history of PCOS  For most of her life she has had irregular menses, huge weight fluctuations despite healthy diet and strenous physical activity ()  In addition she has excess hair growth on her chest and abdomen  Last May she had a miscarriage - first trimester for which she presented to the ER with bleeding as well  After the miscarriage her menses have been regular and she has been able to lose 70lbs  She had an irregular bleeding episode early July and took a pregnancy test which was positive  Last PM she began to have cramping and began to pass clots    She came to the ER for evaluation  In the ER HCG was 35  Ultrasound shows normal ultrasound - no IUP, no free fluid, no adnexal cyst nor mass  Today bleeding is minimal and cramping is minimal       Would like to become pregnant but was not actively trying  Review of Systems   Constitutional: Positive for unexpected weight change  Negative for activity change, appetite change, chills, fatigue and fever  HENT: Negative for rhinorrhea, sneezing and sore throat  Eyes: Negative for visual disturbance  Respiratory: Negative for cough, shortness of breath and wheezing  Cardiovascular: Negative for chest pain, palpitations and leg swelling  Gastrointestinal: Negative for abdominal distention, constipation, diarrhea, nausea and vomiting  Genitourinary: Positive for menstrual problem and vaginal bleeding  Negative for difficulty urinating, pelvic pain and vaginal pain  Neurological: Negative for syncope and light-headedness  Historical Information   Past Medical History:   Diagnosis Date    Asthma     Diabetes mellitus ([facility] [address] )     Metabolic syndrome     PCOS (polycystic ovarian syndrome)      History reviewed  No pertinent surgical history  OB History    Para Term  AB Living   2       1     SAB TAB Ectopic Multiple Live Births                  # Outcome Date GA Lbr Javad/2nd Weight Sex Delivery Anes PTL Lv   2 Current            1 AB                 History reviewed  No pertinent family history  Social History   History   Alcohol Use No     History   Drug Use No     History   Smoking Status    Never Smoker   Smokeless Tobacco    Never Used       Meds/Allergies   all current active meds have been reviewed  No Known Allergies    Objective   Vitals: Blood pressure 135/63, pulse 82, temperature 98 2 °F (36 8 °C), temperature source Oral, resp  rate 18, height 5' 6" (1 676 m), weight 56 7 kg (125 lb), SpO2 99 %        Intake/Output Summary (Last 24 hours) at 18 1344  Last data filed at 18 8869   Gross per 24 hour   Intake             2000 ml   Output              300 ml   Net             1700 ml       Invasive Devices: Invasive Devices     Peripheral Intravenous Line            Peripheral IV 07/31/18 Left Antecubital less than 1 day                Physical Exam   Constitutional: She is oriented to person, place, and time  She appears well-developed and well-nourished  No distress  Cardiovascular: Normal rate, regular rhythm and normal heart sounds  Exam reveals no gallop and no friction rub  No murmur heard  Pulmonary/Chest: Effort normal and breath sounds normal  No respiratory distress  Abdominal: Soft  Bowel sounds are normal  She exhibits no distension  There is no tenderness  There is no rebound and no guarding  Neurological: She is alert and oriented to person, place, and time  She has normal reflexes  Skin: She is not diaphoretic  Lab Results: I have personally reviewed pertinent reports  Imaging: I have personally reviewed images  Report from radiology below:  8/1/18  FINDINGS:     No gestational sac or fetal pole identified  No yolk sac      UTERUS/ADNEXA:   The uterus and ovaries are within normal limits  The cervix remains closed  No free fluid present  Counseling / Coordination of Care  Total floor / unit time spent today 45 minutes  Greater than 50% of total time was spent with the patient and / or family counseling and / or coordination of care  A description of the counseling / coordination of care: spontaneous miscarriage- expected recovery, follow up, reasons for concern  PCOS- implications for metabolic syndrome precursor to diabetes, increase risk of miscarriage  Contraception- need for prevention of pregnancy until health has improved, clinical implications of uncontrolled diabetes on pregnancy  Safe and effective use of medication discussed

## 2018-08-01 NOTE — ASSESSMENT & PLAN NOTE
· States she passed a large blood clot a few days ago while attempting to urinate and noted that her belly distention over the last several weeks along with breast size have decreased - she noted her symptoms were similar to her previous miscarriage at 12 weeks gestation approximately 1 year ago  · Plan per OBGYN Dr Peter Freedman trend HCG levels  · Outpatient f/u with gyn to start on OC added to d/c summary

## 2018-08-01 NOTE — PROGRESS NOTES
Progress Note - Melba Soliz 1990, 34 y o  female MRN: [id number]    Unit/Bed#: -01 Encounter: [id number]    Primary Care Provider: No primary care provider on file  Date and time admitted to hospital: 7/31/2018 10:55 PM        * Hyperglycemia - New onset diabetes mellitus   Assessment & Plan    · HbA1c 13 9   · Continue SSI per accucheck  · Lantus 10 units SQ at HS  · Endocrine consulted   · PMH: PCOS and metabolic syndrome was on metformin but discontinued it due to intolerability of side effects  · Check triglycerides and cholesterol level potential cardiovascular risk in diabetes  · May shower  · Nutritional consult for diabetic diet, newly diagnosed Diabetes Mellitus        Hyponatremia   Assessment & Plan    · Resolved, likely due to hyperglycemia  · Continue hydration        Miscarriage   Assessment & Plan    · States she passed a large blood clot a few days ago while attempting to urinate and noted that her belly distention over the last several weeks along with breast size have decreased - she noted her symptoms were similar to her previous miscarriage at 12 weeks gestation approximately 1 year ago  · Plan per OBGYN Dr Cynthia Huitron trend HCG level in AM, currently 35  · Outpatient f/u with gyn to start on OCP added to d/c summary  VTE Pharmacologic Prophylaxis:   Pharmacologic: low risk  Mechanical VTE Prophylaxis in Place: Yes    Patient Centered Rounds: I have performed bedside rounds with nursing staff today  Discussions with Specialists or Other Care Team Provider: OBGYN note reviewed, endo consult pending     Education and Discussions with Family / Patient: d/w patient and her mother present at bedside     Time Spent for Care: 30 minutes  More than 50% of total time spent on counseling and coordination of care as described above      Current Length of Stay: 1 day(s)    Current Patient Status: Inpatient   Certification Statement: The patient will continue to require additional inpatient hospital stay due to await endo eval for newly diagnosed diabetes     Discharge Plan: anticipate d/c home tomorrow     Code Status: Level 1 - Full Code      Subjective:   Patient seen and examined today  Stated she has had a history of PCOS and followed with Endocrine in [place] approximately 3 years ago  She was managed with Metformin for which she had intolerable side effects  She denies any SOB, chest pain, fevers, polydipsia, polyphagia, or blurred vision  Stated at her last eye exam her prescription changed and she notices a difference is she changes from glasses to contacts  Denies any acute vision disturbances  Requesting to shower as she is feeling gross after having a diaphoretic episode last pm after the potassium infusion  Objective:     Vitals:   Temp (24hrs), Av 2 °F (36 8 °C), Min:98 2 °F (36 8 °C), Max:98 4 °F (36 9 °C)    HR:  [] 82  Resp:  [16-18] 18  BP: (120-135)/(63-85) 135/63  SpO2:  [97 %-100 %] 99 %  Body mass index is 20 18 kg/m²  Input and Output Summary (last 24 hours): Intake/Output Summary (Last 24 hours) at 18 1423  Last data filed at 18 9772   Gross per 24 hour   Intake             2000 ml   Output              300 ml   Net             1700 ml       Physical Exam:     Physical Exam   Constitutional: She is oriented to person, place, and time  She appears well-developed and well-nourished  HENT:   Head: Normocephalic  Eyes: EOM are normal    Neck: Normal range of motion  Cardiovascular: Normal rate, regular rhythm, normal heart sounds and intact distal pulses  Pulmonary/Chest: Effort normal and breath sounds normal  No respiratory distress  Abdominal: Soft  Bowel sounds are normal    Musculoskeletal: Normal range of motion  Neurological: She is alert and oriented to person, place, and time  Skin: Skin is warm and dry  She is not diaphoretic  No pallor  Psychiatric: She has a normal mood and affect   Her behavior is normal      Additional Data:     Labs:      Results from last 7 days  Lab Units 08/01/18  0515 07/31/18  2305   WBC Thousand/uL 9 15 9 28   HEMOGLOBIN g/dL 12 0 12 5   HEMATOCRIT % 35 0 36 6   PLATELETS Thousands/uL 343 357   NEUTROS PCT %  --  46   LYMPHS PCT %  --  41   MONOS PCT %  --  8   EOS PCT %  --  4       Results from last 7 days  Lab Units 08/01/18  1055 08/01/18  0515   SODIUM mmol/L 133* 133*   POTASSIUM mmol/L  --  3 5   CHLORIDE mmol/L  --  99*   CO2 mmol/L  --  28   BUN mg/dL  --  12   CREATININE mg/dL  --  0 67   CALCIUM mg/dL  --  9 0   TOTAL PROTEIN g/dL  --  6 5   BILIRUBIN TOTAL mg/dL  --  0 20   ALK PHOS U/L  --  60   ALT U/L  --  11*   AST U/L  --  5   GLUCOSE RANDOM mg/dL  --  348*           Results from last 7 days  Lab Units 08/01/18  1051 08/01/18  0624 08/01/18  0149   POC GLUCOSE mg/dl 312* 324* 441*       Results from last 7 days  Lab Units 07/31/18  2305   HEMOGLOBIN A1C % 13 9*         * I Have Reviewed All Lab Data Listed Above  * Additional Pertinent Lab Tests Reviewed: [address] Admission Reviewed    Imaging:    Imaging Reports Reviewed Today Include: none   Imaging Personally Reviewed by Myself Includes:  none    Recent Cultures (last 7 days):           Last 24 Hours Medication List:     Current Facility-Administered Medications:  acetaminophen 650 mg Oral Q6H PRN Hung Macdonald MD    insulin glargine 10 Units Subcutaneous HS Genesis Arellano PA-C    insulin lispro 1-5 Units Subcutaneous 4x Daily (AC & HS) Hung Macdonald MD    ondansetron 4 mg Intravenous Q6H PRN Hung Macdonald MD    sodium chloride 125 mL/hr Intravenous Continuous Hung Macdonald MD Last Rate: 125 mL/hr (08/01/18 0236)        Today, Patient Was Seen By: Rebecca Johnson PA-C    ** Please Note: Dictation voice to text software may have been used in the creation of this document   **

## 2018-08-01 NOTE — H&P
History & Physical - [facility] Service - Internal Medicine        PATIENT INFORMATION      Criselda Davis 34 y o  female MRN: [id number]  Unit/Bed#: -01 Encounter: [id number]  Admitting Physician: Rubens Rae MD  PCP: No primary care provider on file    Date of Admission:  08/01/18      ASSESSMENTS & PLAN       Hyperglycemia - New onset diabetes mellitus   Assessment & Plan    - check HbA1c   - ordered 10 units of regular insulin IV - placed on SSI coverage per [employer] - continue aggressive IV fluid hydration  - will appreciate endocrinology input  - recalls being on Metformin approximately three years ago for unrelated PCOS which she took herself off of due to intolerability      Hyponatremia   Assessment & Plan    - likely pseudohyponatremia from significant hyperglycemia - continue IV fluids and insulin for elevated blood sugars and monitor serum sodium levels   - will check serum osmolality, urine osmolality, and urine sodium to assess for possibility of alternate etiology      Miscarriage   Assessment & Plan    - note similar episode a year ago with confirmed miscarriage - states she passed a large blood clot a few days ago while attempting to urinate and noted that her belly distention over the last several weeks along with breast size have decreased - she noted her symptoms were similar to her previous episode as aforementioned  - a quantitative HCG in the ER was only mildly elevated at 35 (low for being pregnant for several weeks already)   - transvaginal ultrasound today was negative for intrauterine gestation or adnexal mass the patient denies any abdominal/suprapubic pain at this time  - supportive care offered          DVT Prophylaxis:  SCDs - Ambulatory      CHIEF COMPLAINT      Vaginal bleed/miscarriage and elevated blood sugars      HISTORY OF PRESENT ILLNESS      Criselda Davis is a 34 y o  female who initially presented to the ER yesterday evening complaining of vaginal bleeding and passing of a large clot reminiscent of a previous episode a year ago which was diagnosed as a miscarriage  She notes that she has been pregnant over the last several weeks which she confirm with pregnancy testing and past a similar clot with associated cramping last night  In the ER, a quantitative HCG was low at 35 for being approximately six seven weeks pregnant  A follow-up transvaginal ultrasound was negative for evidence of intrauterine gestation or adnexal mass  She was initially discharge from the hospital later yesterday evening but then told to come back as her blood work revealed an elevated blood sugar of 627  She has no prior history of diabetes mellitus but in the past was on a trial of Metformin for PCOS  She denies any polyuria, polydipsia, polyphagia, but does state that her vision has been blurry over the last week or so  She was started on IV fluids for resuscitation/hydration in the ER  Upon my encounter on the medical floor, her mother is present at bedside and notes that she herself has a history of diabetes mellitus  The patient denies any other complaints at this time including nausea/vomiting, fever/chills, or other constitutional symptoms  She remains in pleasant/hopeful spirits  REVIEW OF SYSTEMS      Review of Systems - A thorough 12 point review systems was conducted  Pertinent positives and negatives are mentioned in the history of present illness  PAST MEDICAL & SURGICAL HISTORY      Past Medical History:   Diagnosis Date    Asthma     Diabetes mellitus ([address] )     Metabolic syndrome     PCOS (polycystic ovarian syndrome)        History reviewed  No pertinent surgical history        MEDICATIONS & ALLERGIES       Prior to Admission medications    Not on File         Allergies: No Known Allergies      SOCIAL HISTORY         Patient Pre-hospital Living Situation:  Lives at home  Patient Pre-hospital Level of Mobility:  Ambulatory    Substance Use History:   History   Alcohol Use No     History   Smoking Status    Never Smoker   Smokeless Tobacco    Never Used     History   Drug Use No         FAMILY HISTORY      Significant diabetes mellitus in mother  Cannot recall other family history at this time  PHYSICAL EXAM      Vitals:   Blood Pressure: 129/71 (08/01/18 0123)  Pulse: 80 (08/01/18 0123)  Temperature: 98 2 °F (36 8 °C) (08/01/18 0123)  Temp Source: Oral (08/01/18 0123)  Respirations: 18 (08/01/18 0123)  Weight - Scale: 62 9 kg (138 lb 10 7 oz) (07/31/18 2238)  SpO2: 97 % (08/01/18 0123)      GENERAL:  Well-developed/nourished - no immediate distress  HEAD:  Normocephalic - atraumatic  EYES: PERRL - EOMI   MOUTH:  Mucosa somewhat dry  NECK:  Supple - full range of motion  CARDIAC:  Regular rate/rhythm - S1/S2 positive  PULMONARY:  Clear breath sounds bilaterally - nonlabored respirations  ABDOMEN:  Soft - nontender/nondistended - active bowel sounds  MUSCULOSKELETAL:  Motor strength/range of motion intact  NEUROLOGIC:  Alert/oriented x 3 - cranial nerves grossly intact  SKIN:  Age-appropriate wrinkles/blemishes   PSYCHIATRIC:  Mood/affect stable      ADDITIONAL DATA     Lab Results:       Results from last 7 days  Lab Units 07/31/18  2305   WBC Thousand/uL 9 28   HEMOGLOBIN g/dL 12 5   HEMATOCRIT % 36 6   PLATELETS Thousands/uL 357   NEUTROS PCT % 46   LYMPHS PCT % 41   MONOS PCT % 8   EOS PCT % 4       Results from last 7 days  Lab Units 08/01/18  0001   SODIUM mmol/L 128*   POTASSIUM mmol/L 3 8   CHLORIDE mmol/L 94*   CO2 mmol/L 28   BUN mg/dL 12   CREATININE mg/dL 0 76   CALCIUM mg/dL 8 7   GLUCOSE RANDOM mg/dL 515*           Imaging:     Us Ob Pregnancy Limited With Transvaginal    Result Date: 7/31/2018  Narrative: FIRST TRIMESTER OBSTETRIC ULTRASOUND, COMPLETE INDICATION:  Threatened miscarriage, vaginal bleeding  LMP is 6/11/2018   COMPARISON: None  TECHNIQUE:   Transabdominal ultrasound of the pelvis was performed    Additional transvaginal imaging was then performed to better assess the gestation, myometrial/endometrial architecture and ovarian parenchymal detail  The study includes volumetric sweeps and traditional still imaging technique  FINDINGS: No gestational sac or fetal pole identified  No yolk sac  UTERUS/ADNEXA: The uterus and ovaries are within normal limits  The cervix remains closed  No free fluid present  Impression: No intrauterine gestation or adnexal mass identified  Differential remains early IUP, spontaneous  and ectopic pregnancy  Short-term follow-up with serial beta-hCG and pelvic/OB ultrasound in 1 to 2 weeks  Workstation performed: [id number]       Code Status:  Full code      Anticipated Length of Stay:  Patient will be admitted on an Inpatient basis with an anticipated length of stay of  greater than 2 midnights  Justification for Hospital Stay:  Who significant hyperglycemia likely new onset diabetes mellitus requiring initiation of insulin, endocrinology evaluation, and IV fluid resuscitation in the setting of a possible recent miscarriage  Total Time for Visit, including Counseling / Coordination of Care: 72 minutes  Greater than 50% of this total time spent on direct patient counseling and coordination of care     ** Please Note: This note is constructed using a voice recognition dictation system   **

## 2018-08-01 NOTE — ED PROVIDER NOTES
History  Chief Complaint   Patient presents with    Abnormal Lab     Patient seen earlier for miscarriage  Patient called back by Dr Cornelia Alonzo for elevated blood glucose  HPI   53-year-old female with history of PCOS and metabolic syndrome presents to the emergency department after receiving a call back regarding hyperglycemia  Patient was seen in the emergency department earlier today for evaluation of vaginal bleeding during pregnancy  She was found to have glucose in her urine  She was instructed to follow up outpatient with PCP and for blood work, but labs were able to be run on pre-existing blood work  Patient was called to the ED upon return of BG over 600  Upon presentation in the emergency department, patient denies any complaints  Mother, at bedside, states that patient has recently admitted to her that she has been having intermittent blurred vision, fatigue, and weight loss  Mom also states that while patient is very mild mannered, over the past few months she has been more heredia  Patient denies any known diagnosis of diabetes, but states that years ago she was diagnosed as prediabetic  At that time, she was prescribed metformin, but discontinued it due to GI intolerance  She was also on another medication "similar to Victoza," but also discontinued that about 3 years ago  She has not since had dedicated follow up, but does recall that when she had her first miscarriage in May 2017, A1c was checked and she believes it was normal   She has since lost 70 lbs (mostly unintentionally) and thought her risk of developing diabetes had gone away  On ROS, patient complains of nausea and vomiting during the last week or so (thought due to pregnancy), mild abdominal cramping, vaginal bleeding, and current headache  She denies any noticeable polyuria, polydipsia, or complaints otherwise  Possibly of note, Mom does have history of DM       None       Past Medical History:   Diagnosis Date    Asthma  Diabetes mellitus ([facility] [address] )     Metabolic syndrome     PCOS (polycystic ovarian syndrome)        History reviewed  No pertinent surgical history  History reviewed  No pertinent family history  I have reviewed and agree with the history as documented  Social History   Substance Use Topics    Smoking status: Never Smoker    Smokeless tobacco: Never Used    Alcohol use No        Review of Systems   Constitutional: Positive for fatigue  Eyes: Positive for visual disturbance  Respiratory: Positive for shortness of breath  Cardiovascular: Negative for chest pain  Gastrointestinal: Positive for abdominal pain, nausea and vomiting  Endocrine: Negative  Genitourinary: Positive for vaginal bleeding  Skin: Negative  Neurological: Positive for headaches  Psychiatric/Behavioral: Positive for agitation  Negative for confusion  The patient is not nervous/anxious  All other systems reviewed and are negative  Physical Exam  Physical Exam   Constitutional: She is oriented to person, place, and time  She appears well-nourished  No distress  HENT:   Head: Normocephalic and atraumatic  Eyes: EOM are normal    Neck: Normal range of motion  Neck supple  Cardiovascular: Normal rate and regular rhythm  Pulmonary/Chest: Effort normal and breath sounds normal  No respiratory distress  Abdominal: Soft  She exhibits no distension  There is no tenderness  Musculoskeletal: Normal range of motion  Neurological: She is alert and oriented to person, place, and time  Skin: Skin is warm and dry  Capillary refill takes less than 2 seconds  She is not diaphoretic  Psychiatric: She has a normal mood and affect  Her behavior is normal    Nursing note and vitals reviewed        Vital Signs  ED Triage Vitals [07/31/18 2238]   Temperature Pulse Respirations Blood Pressure SpO2   98 2 °F (36 8 °C) 88 16 134/85 100 %      Temp Source Heart Rate Source Patient Position - Orthostatic VS BP Location FiO2 (%)   Oral Monitor Sitting Left arm --      Pain Score       7           Vitals:    08/01/18 0044 08/01/18 0123 08/01/18 0700 08/01/18 1608   BP: 126/64 129/71 135/63 114/65   Pulse: 82 80 82 77   Patient Position - Orthostatic VS: Lying Sitting Lying Lying       Visual Acuity      ED Medications  Medications   sodium chloride 0 9 % infusion (125 mL/hr Intravenous New Bag 8/1/18 1510)   insulin lispro (HumaLOG) 100 units/mL subcutaneous injection 1-5 Units (5 Units Subcutaneous Given 8/1/18 2116)   ondansetron (ZOFRAN) injection 4 mg (not administered)   acetaminophen (TYLENOL) tablet 650 mg (650 mg Oral Given 8/1/18 1657)   insulin glargine (LANTUS) subcutaneous injection 10 Units 0 1 mL (10 Units Subcutaneous Given 8/1/18 2112)   sodium chloride 0 9 % bolus 1,000 mL (0 mL Intravenous Stopped 8/1/18 0149)   butalbital-acetaminophen-caffeine (FIORICET,ESGIC) -40 mg per tablet 1 tablet (1 tablet Oral Given 7/31/18 2341)   potassium chloride 20 mEq IVPB (premix) (20 mEq Intravenous New Bag 8/1/18 0225)   insulin regular (HumuLIN R,NovoLIN R) injection 10 Units (10 Units Subcutaneous Given 8/1/18 0225)   magnesium sulfate 2 g/50 mL IVPB (premix) 2 g (2 g Intravenous New Bag 8/1/18 1605)   butalbital-acetaminophen-caffeine (FIORICET,ESGIC) -40 mg per tablet 1 tablet (1 tablet Oral Given 8/1/18 2110)       Diagnostic Studies  Results Reviewed     Procedure Component Value Units Date/Time    Osmolality, urine [[id number]]  (Normal) Collected:  07/31/18 1741    Lab Status:  Final result Specimen:  Urine Updated:  08/01/18 0853     Osmolality, Ur 652 mmol/KG     Sodium, urine, random [[id number]] Collected:  07/31/18 1741    Lab Status:  Final result Specimen:  Urine Updated:  08/01/18 0836     Sodium, Ur 23    Hemoglobin A1C [[id number]]  (Abnormal) Collected:  07/31/18 2305    Lab Status:  Final result Specimen:  Blood from Arm, Left Updated:  08/01/18 0833     Hemoglobin A1C 13 9 (H) %       mg/dl     Basic metabolic panel [[id number]]  (Abnormal) Collected:  08/01/18 0001    Lab Status:  Final result Specimen:  Blood from Arm, Left Updated:  08/01/18 0044     Sodium 128 (L) mmol/L      Potassium 3 8 mmol/L      Chloride 94 (L) mmol/L      CO2 28 mmol/L      Anion Gap 6 mmol/L      BUN 12 mg/dL      Creatinine 0 76 mg/dL      Glucose 515 (HH) mg/dL      Calcium 8 7 mg/dL      eGFR 109 ml/min/1 73sq m     Narrative:         National Kidney Disease Education Program recommendations are as follows:  GFR calculation is accurate only with a steady state creatinine  Chronic Kidney disease less than 60 ml/min/1 73 sq  meters  Kidney failure less than 15 ml/min/1 73 sq  meters      Acetone [[id number]]  (Normal) Collected:  07/31/18 2305    Lab Status:  Final result Specimen:  Blood from Arm, Left Updated:  07/31/18 2322     Acetone, Bld Negative    CBC and differential [[id number]] Collected:  07/31/18 2305    Lab Status:  Final result Specimen:  Blood from Arm, Left Updated:  07/31/18 2314     WBC 9 28 Thousand/uL      RBC 4 16 Million/uL      Hemoglobin 12 5 g/dL      Hematocrit 36 6 %      MCV 88 fL      MCH 30 0 pg      MCHC 34 2 g/dL      RDW 12 0 %      MPV 9 6 fL      Platelets 287 Thousands/uL      nRBC 0 /100 WBCs      Neutrophils Relative 46 %      Immat GRANS % 0 %      Lymphocytes Relative 41 %      Monocytes Relative 8 %      Eosinophils Relative 4 %      Basophils Relative 1 %      Neutrophils Absolute 4 24 Thousands/µL      Immature Grans Absolute 0 04 Thousand/uL      Lymphocytes Absolute 3 81 Thousands/µL      Monocytes Absolute 0 74 Thousand/µL      Eosinophils Absolute 0 39 Thousand/µL      Basophils Absolute 0 06 Thousands/µL                  No orders to display              Procedures  Procedures       Phone Contacts  ED Phone Contact    ED Course  ED Course as of Aug 01 2310   Wed Aug 01, 2018   0048 Corrected WNL Sodium: (!) 128                               MDM  Number of Diagnoses or Management Options  Diabetes mellitus, new onset [facility]):   Diagnosis management comments: 53-year-old female with incidentally found new diagnosis of diabetes  Patient unable to set up appointment with PCP until mid-September  Due to risks of prolonged marked hyperglycemia, will admit for further evaluation and to establish diabetes regimen  CritCare Time    Disposition  Final diagnoses:   Diabetes mellitus, new onset ([facility] [address] )     Time reflects when diagnosis was documented in both MDM as applicable and the Disposition within this note     Time User Action Codes Description Comment    7/31/2018 11:33 PM Angelica Bedolla Add [E11 9] Diabetes mellitus, new onset ([facility] [address] )     8/1/2018  1:20 AM Bertrand Suggs Add [O03 9] Miscarriage       ED Disposition     ED Disposition Condition Comment    Admit  Case was discussed with Dr Zach Lopez and the patient's admission status was agreed to be Admission Status: observation status to the service of Dr Zach Lopez   Follow-up Information     Follow up With Specialties Details Why Brielle Rincon MD Obstetrics and Gynecology, Obstetrics, Gynecology Follow up  [address]  [address] [facility]  [phone]            There are no discharge medications for this patient  No discharge procedures on file      ED Provider  Electronically Signed by           Konstantin Colmenares MD  08/01/18 1169

## 2018-08-01 NOTE — ASSESSMENT & PLAN NOTE
· HbA1c 13 9   · Continue SSI per accucheck  · Lantus 10 units SQ at HS  · Endocrine consulted   · PMH: PCOS and metabolic syndrome was on metformin but discontinued it due to intolerability of side effects  · Check triglycerides and cholesterol level potential cardiovascular risk in diabetes    · May shower  · Nutritional consult for diabetic diet, newly diagnosed Diabetes Mellitus

## 2018-08-01 NOTE — NURSING NOTE
Call placed to on call for SL[facility], to make him aware that she cant tolerate the potassium in her IV  He stated to stop it that she doesn't need it  IV potassium stopped  Patient is still crying that her site is hurting her and she wants it out  Same removed  Will attempt restart

## 2018-08-02 LAB
B-HCG SERPL-ACNC: 17 MIU/ML
GLUCOSE SERPL-MCNC: 236 MG/DL (ref 65–140)
GLUCOSE SERPL-MCNC: 276 MG/DL (ref 65–140)
GLUCOSE SERPL-MCNC: 333 MG/DL (ref 65–140)
GLUCOSE SERPL-MCNC: 369 MG/DL (ref 65–140)
GLUCOSE SERPL-MCNC: 370 MG/DL (ref 65–140)
HCG SERPL QL: POSITIVE

## 2018-08-02 PROCEDURE — 84702 CHORIONIC GONADOTROPIN TEST: CPT | Performed by: OBSTETRICS & GYNECOLOGY

## 2018-08-02 PROCEDURE — 82948 REAGENT STRIP/BLOOD GLUCOSE: CPT

## 2018-08-02 PROCEDURE — 99254 IP/OBS CNSLTJ NEW/EST MOD 60: CPT | Performed by: INTERNAL MEDICINE

## 2018-08-02 PROCEDURE — 84703 CHORIONIC GONADOTROPIN ASSAY: CPT | Performed by: PHYSICIAN ASSISTANT

## 2018-08-02 PROCEDURE — 99232 SBSQ HOSP IP/OBS MODERATE 35: CPT | Performed by: NURSE PRACTITIONER

## 2018-08-02 RX ORDER — BUTALBITAL, ACETAMINOPHEN AND CAFFEINE 50; 325; 40 MG/1; MG/1; MG/1
1 TABLET ORAL EVERY 6 HOURS PRN
Status: DISCONTINUED | OUTPATIENT
Start: 2018-08-02 | End: 2018-08-03 | Stop reason: HOSPADM

## 2018-08-02 RX ORDER — SODIUM CHLORIDE 9 MG/ML
75 INJECTION, SOLUTION INTRAVENOUS CONTINUOUS
Status: DISCONTINUED | OUTPATIENT
Start: 2018-08-02 | End: 2018-08-03

## 2018-08-02 RX ORDER — INSULIN GLARGINE 100 [IU]/ML
20 INJECTION, SOLUTION SUBCUTANEOUS
Status: DISCONTINUED | OUTPATIENT
Start: 2018-08-02 | End: 2018-08-03 | Stop reason: HOSPADM

## 2018-08-02 RX ADMIN — INSULIN LISPRO 7 UNITS: 100 INJECTION, SOLUTION INTRAVENOUS; SUBCUTANEOUS at 12:55

## 2018-08-02 RX ADMIN — SODIUM CHLORIDE 75 ML/HR: 0.9 INJECTION, SOLUTION INTRAVENOUS at 15:56

## 2018-08-02 RX ADMIN — ACETAMINOPHEN 650 MG: 325 TABLET, FILM COATED ORAL at 08:49

## 2018-08-02 RX ADMIN — ACETAMINOPHEN 650 MG: 325 TABLET, FILM COATED ORAL at 21:28

## 2018-08-02 RX ADMIN — ONDANSETRON 4 MG: 2 INJECTION, SOLUTION INTRAMUSCULAR; INTRAVENOUS at 21:32

## 2018-08-02 RX ADMIN — INSULIN LISPRO 2 UNITS: 100 INJECTION, SOLUTION INTRAVENOUS; SUBCUTANEOUS at 17:26

## 2018-08-02 RX ADMIN — SODIUM CHLORIDE 125 ML/HR: 0.9 INJECTION, SOLUTION INTRAVENOUS at 00:42

## 2018-08-02 RX ADMIN — INSULIN LISPRO 3 UNITS: 100 INJECTION, SOLUTION INTRAVENOUS; SUBCUTANEOUS at 08:49

## 2018-08-02 RX ADMIN — INSULIN LISPRO 7 UNITS: 100 INJECTION, SOLUTION INTRAVENOUS; SUBCUTANEOUS at 17:26

## 2018-08-02 RX ADMIN — SODIUM CHLORIDE 75 ML/HR: 0.9 INJECTION, SOLUTION INTRAVENOUS at 21:28

## 2018-08-02 RX ADMIN — INSULIN LISPRO 4 UNITS: 100 INJECTION, SOLUTION INTRAVENOUS; SUBCUTANEOUS at 21:32

## 2018-08-02 RX ADMIN — INSULIN LISPRO 4 UNITS: 100 INJECTION, SOLUTION INTRAVENOUS; SUBCUTANEOUS at 12:54

## 2018-08-02 RX ADMIN — BUTALBITAL, ACETAMINOPHEN, AND CAFFEINE 1 TABLET: 50; 325; 40 TABLET ORAL at 19:07

## 2018-08-02 RX ADMIN — INSULIN GLARGINE 20 UNITS: 100 INJECTION, SOLUTION SUBCUTANEOUS at 21:28

## 2018-08-02 NOTE — PLAN OF CARE
Problem: Nutrition/Hydration-ADULT  Goal: Nutrient/Hydration intake appropriate for improving, restoring or maintaining nutritional needs  Monitor and assess patient's nutrition/hydration status for malnutrition (ex- brittle hair, bruises, dry skin, pale skin and conjunctiva, muscle wasting, smooth red tongue, and disorientation)  Collaborate with interdisciplinary team and initiate plan and interventions as ordered  Monitor patient's weight and dietary intake as ordered or per policy  Utilize nutrition screening tool and intervene per policy  Determine patient's food preferences and provide high-protein, high-caloric foods as appropriate  INTERVENTIONS:  - Monitor oral intake, urinary output, labs, and treatment plans  - Assess nutrition and hydration status and recommend course of action  - Evaluate amount of meals eaten  - Assist patient with eating if necessary   - Allow adequate time for meals  - Recommend/ encourage appropriate diets, oral nutritional supplements, and vitamin/mineral supplements  - Order, calculate, and assess calorie counts as needed  - Recommend, monitor, and adjust tube feedings and TPN/PPN based on assessed needs  - Assess need for intravenous fluids  - Provide specific nutrition/hydration education as appropriate  - Include patient/family/caregiver in decisions related to nutrition  Outcome: Progressing  Goal: Pt will comply with prescribed diet consuming no more than 5 CHO per meal and have improved BG by next review

## 2018-08-02 NOTE — ASSESSMENT & PLAN NOTE
· HbA1c 13 9   · Patient evaluated by endocrinology  · Lantus increased to 20 U subcu at HS  · Sliding scale and 7 U Humalog with each meal   · Noted PMH: PCOS and metabolic syndrome was on metformin but discontinued it due to intolerability of side effects  · Patient should clinically display her ability to give herself insulin and glucometer education  ·  Lipid panel reviewed LDL 96, triglycerides 54 total cholesterol 168    · May shower  · Nutritional consult for diabetic diet, newly diagnosed Diabetes Mellitus

## 2018-08-02 NOTE — ASSESSMENT & PLAN NOTE
· States she passed a large blood clot a few days ago while attempting to urinate and noted that her belly distention over the last several weeks along with breast size have decreased - she noted her symptoms were similar to her previous miscarriage at 12 weeks gestation approximately 1 year ago  · Plan per OBGYN Dr Linda Diaz trend HCG levels  · Outpatient f/u with gyn to start on OC added to d/c summary

## 2018-08-02 NOTE — CONSULTS
Consultation - Aicha Germain 34 y o  female MRN: [id number]    Unit/Bed#: -01 Encounter: [id number]      Assessment/Plan     Assessment/Plan:  1  Diabetes: Will increase Lantus to 20 units q h s  and add Humalog 7 units a c   Continue scale for correction  Discussed with the patient that I think insulin is the best option for her to be discharged on with outpatient follow-up with either Endocrinology or her PCP  She may be able to get into her PCP sooner which may be the better option at 1st   I will provide our endocrinology office information  In the past has not tolerated metformin so will hold off on any p o  agents for now  I suggest we also screen for autoimmune markers of type 1 diabetes and C-peptide levels for pancreas function  While she is in the hospital, will continue to monitor blood sugars and adjust regimen over time  In the past she did use injections with saxenda and is comfortable with injections  I did ask nursing to go over basic glucometer, insulin injection, basic diabetes education  Sliding scale for home Before Breakfast Before Lunch Before Evening Meal Bedtime          Extra fast acting insulin   <80 0 0 0 0    0 0 0 0   151-200 1 1 1 0   201-250 2 2 2 0   251-300 3 3 3 0   301-350 4 4 4 0   >350 5 5 5 0     Upon discharge, patient will need prescription for the following:  -basal insulin  -bolus insulin  -pen needles  -glucometer  -lancets  -test strips  -sliding scale for extra fast acting insulin as above    Upon discharge, if Lantus is not the preferred basal insulin for the patient's insurance company, we can use Tresiba, Basaglar, Levemir, Toujeo at the same dose instead  Upon discharge, if Humalog is not the preferred mealtime insulin for the patient's insurance, we can use NovoLog, Fiasp,  or Apidra at the same dose instead      It may be beneficial of social work look into covered insulins prior to discharge and before rating a prescription for discharge  REQUIRED DOCUMENTATION:     1  This service was provided via Telemedicine  2  Provider located at [address] [place] [address]  TeleMed provider: DO Nai Heller  Identify all parties in room with patient during tele consult:  RN   5  After connecting through [employer], patient was identified by name and date of birth and assistant checked wristband  Patient was then informed that this was a Telemedicine visit and that the exam was being conducted confidentially over secure lines  My office door was closed  The following individuals were in the room with me and the patient informed NP and MD from our office  Patient acknowledged consent and understanding of privacy and security of the Telemedicine visit, and gave us permission to have the assistant stay in the room in order to assist with the history and to conduct the exam   I informed the patient that I have reviewed their record in Epic and presented the opportunity for them to ask any questions regarding the visit today  The patient agreed to participate  CC: Diabetes Consult    History of Present Illness     HPI: Maris De Santiago is a 34y o  year old female presents with spontaneous miscarriage  Since then she has had 70lb weight loss  She also had a miscarriage in June of 2017  No history of gestational diabetes though did not have pregnancy long enough to be tested for this  She does have frequent urination infrequent fluid intake but states she likes to keep well hydrated and this polyuria is nothing new or worse  In the past for PCOS she was put on metformin but did not tolerate various formulations in doses due to GI intolerance  She denies any other symptoms and overall is feeling well  Her appetite is fine and she is eating well  She does have a family history with her mother has type 2 diabetes  Upon admission to the hospital she is found to have hyperglycemia as well as an A1c of 13 9      Consults    Review of Systems   Constitutional: Positive for unexpected weight change (70 lbs since miscarriage)  Negative for appetite change  HENT: Negative for congestion and trouble swallowing  Eyes: Negative for visual disturbance  Respiratory: Negative for shortness of breath  Cardiovascular: Negative for leg swelling  Gastrointestinal: Negative for abdominal pain, nausea and vomiting  Endocrine: Positive for polydipsia (nothing more than usual) and polyuria (nothing more than usual)  Genitourinary: Negative for difficulty urinating and frequency  Musculoskeletal: Negative for arthralgias  Skin: Negative for rash  Neurological: Negative for dizziness and weakness  Psychiatric/Behavioral: Negative for agitation and confusion  Historical Information   Past Medical History:   Diagnosis Date    Asthma     Diabetes mellitus ([facility] [address] )     Metabolic syndrome     PCOS (polycystic ovarian syndrome)      History reviewed  No pertinent surgical history  Social History   History   Alcohol Use No     History   Drug Use No     History   Smoking Status    Never Smoker   Smokeless Tobacco    Never Used     Family History: History reviewed  No pertinent family history      Meds/Allergies   Current Facility-Administered Medications   Medication Dose Route Frequency Provider Last Rate Last Dose    acetaminophen (TYLENOL) tablet 650 mg  650 mg Oral Q6H PRN Yusra Mcneil MD   650 mg at 08/02/18 0849    insulin glargine (LANTUS) subcutaneous injection 20 Units 0 2 mL  20 Units Subcutaneous HS Li Dinning, DO        insulin lispro (HumaLOG) 100 units/mL subcutaneous injection 1-5 Units  1-5 Units Subcutaneous 4x Daily (AC & HS) Yusra Mcneil MD   3 Units at 08/02/18 0849    insulin lispro (HumaLOG) 100 units/mL subcutaneous injection 7 Units  7 Units Subcutaneous TID With Meals Li Dinning, DO        ondansetron [facility]) injection 4 mg  4 mg Intravenous Q6H PRN Yusra Mcneil MD        sodium chloride 0 9 % infusion  125 mL/hr Intravenous Continuous Aleja Arvizu  mL/hr at 08/02/18 0042 125 mL/hr at 08/02/18 0042     No Known Allergies    Objective   Vitals: Blood pressure 118/73, pulse 78, temperature 98 3 °F (36 8 °C), resp  rate 18, height 5' 6" (1 676 m), weight 56 7 kg (125 lb), SpO2 98 %  Intake/Output Summary (Last 24 hours) at 08/02/18 1223  Last data filed at 08/02/18 0042   Gross per 24 hour   Intake             1240 ml   Output                0 ml   Net             1240 ml     Invasive Devices     Peripheral Intravenous Line            Peripheral IV 08/02/18 Left Hand less than 1 day                Physical Exam   Vitals reviewed  Gen: NAD  AAOx3  HEENT: NC/AT  Resp: No respiratory distress  Neuro: Moves all extremities spontaneously  Psych: Appropriate mood and affect  Tele consult  The history was obtained from the review of the chart, patient  Lab Results:     Results from last 7 days  Lab Units 07/31/18  2305   HEMOGLOBIN A1C % 13 9*     Lab Results   Component Value Date    WBC 9 15 08/01/2018    HGB 12 0 08/01/2018    HCT 35 0 08/01/2018    MCV 87 08/01/2018     08/01/2018     Lab Results   Component Value Date/Time    BUN 12 08/01/2018 05:15 AM     (L) 08/01/2018 10:55 AM    K 3 5 08/01/2018 05:15 AM    CL 99 (L) 08/01/2018 05:15 AM    CO2 28 08/01/2018 05:15 AM    CREATININE 0 67 08/01/2018 05:15 AM    AST 5 08/01/2018 05:15 AM    ALT 11 (L) 08/01/2018 05:15 AM    ALB 3 1 (L) 08/01/2018 05:15 AM     Recent Labs      08/01/18   1055   CHOL  168   HDL  61*   TRIG  54     No results found for: KEILA HULL  POC Glucose (mg/dl)   Date Value   08/02/2018 333 (H)   08/02/2018 276 (H)   08/01/2018 370 (H)   08/01/2018 468 (H)   08/01/2018 341 (H)   08/01/2018 312 (H)   08/01/2018 324 (H)   08/01/2018 441 (H)       Imaging Studies: I have personally reviewed pertinent reports        US OB pregnancy limited with transvaginal [[id number]] Collected: 07/31/18 7038   Order Status: Completed Updated: 18 1746   Narrative:     FIRST TRIMESTER OBSTETRIC ULTRASOUND, COMPLETE    INDICATION:  Threatened miscarriage, vaginal bleeding   LMP is 2018      COMPARISON: None  TECHNIQUE:   Transabdominal ultrasound of the pelvis was performed   Additional transvaginal imaging was then performed to better assess the gestation, myometrial/endometrial architecture and ovarian parenchymal detail   The study includes volumetric   sweeps and traditional still imaging technique  FINDINGS:    No gestational sac or fetal pole identified   No yolk sac  UTERUS/ADNEXA:   The uterus and ovaries are within normal limits  The cervix remains closed  No free fluid present  Impression:       No intrauterine gestation or adnexal mass identified  Differential remains early IUP, spontaneous  and ectopic pregnancy   Short-term follow-up with serial beta-hCG and pelvic/OB ultrasound in 1 to 2 weeks

## 2018-08-02 NOTE — ASSESSMENT & PLAN NOTE
· Resolved, likely due to hyperglycemia    · Continue hydration however will decrease rate to 75 mL an hours

## 2018-08-02 NOTE — PROGRESS NOTES
Progress Note - Connie Dumont 1990, 34 y o  female MRN: [id number]    Unit/Bed#: -01 Encounter: [id number]    Primary Care Provider: No primary care provider on file  Date and time admitted to hospital: 7/31/2018 10:55 PM        * Hyperglycemia - New onset diabetes mellitus   Assessment & Plan    · HbA1c 13 9   · Patient evaluated by endocrinology  · Lantus increased to 20 U subcu at HS  · Sliding scale and 7 U Humalog with each meal   · Noted PMH: PCOS and metabolic syndrome was on metformin but discontinued it due to intolerability of side effects  · Patient should clinically display her ability to give herself insulin and glucometer education  ·  Lipid panel reviewed LDL 96, triglycerides 54 total cholesterol 168  · May shower  · Nutritional consult for diabetic diet, newly diagnosed Diabetes Mellitus            Hyponatremia   Assessment & Plan    · Resolved, likely due to hyperglycemia  · Continue hydration however will decrease rate to 75 mL an hours          Miscarriage   Assessment & Plan    · States she passed a large blood clot a few days ago while attempting to urinate and noted that her belly distention over the last several weeks along with breast size have decreased - she noted her symptoms were similar to her previous miscarriage at 12 weeks gestation approximately 1 year ago  · Plan per OBGYN Dr Otoniel Carson trend HCG levels  · Outpatient f/u with gyn to start on OC added to d/c summary  VTE Pharmacologic Prophylaxis:   Pharmacologic: Pharmacologic VTE Prophylaxis contraindicated due to Low risk  Mechanical VTE Prophylaxis in Place: Yes    Patient Centered Rounds: I have performed bedside rounds with nursing staff today  Discussions with Specialists or Other Care Team Provider:  Endocrine note reviewed    Education and Discussions with Family / Patient:  Patient    Time Spent for Care: 25 minutes    More than 50% of total time spent on counseling and coordination of care as described above  Current Length of Stay: 2 day(s)    Current Patient Status: Inpatient   Certification Statement: The patient will continue to require additional inpatient hospital stay due to Need for acute monitoring for new onset diabetes    Discharge Plan:  Anticipate discharge home tomorrow    Code Status: Level 1 - Full Code      Subjective:   Patient reporting mild headache has no other general complaints  Patient denies dizziness headache chest pain shortness of breath abdominal pain generally states she feels well and understands the need however to stay another night given the need for further monitoring of her blood glucose on the new regimen recommended by Endocrinology  Objective:     Vitals:   Temp (24hrs), Av 2 °F (36 8 °C), Min:97 8 °F (36 6 °C), Max:98 5 °F (36 9 °C)    HR:  [78-82] 82  Resp:  [18] 18  BP: (115-118)/(70-74) 115/74  SpO2:  [96 %-99 %] 99 %  Body mass index is 20 18 kg/m²  Input and Output Summary (last 24 hours): Intake/Output Summary (Last 24 hours) at 18 1735  Last data filed at 18 1242   Gross per 24 hour   Intake             1480 ml   Output                0 ml   Net             1480 ml       Physical Exam:     Physical Exam   Constitutional: She is oriented to person, place, and time  She appears well-developed and well-nourished  HENT:   Head: Normocephalic and atraumatic  Eyes: Conjunctivae and EOM are normal    Neck: Normal range of motion  Cardiovascular: Normal rate, regular rhythm and normal heart sounds  Pulmonary/Chest: Effort normal and breath sounds normal    Abdominal: Soft  Bowel sounds are normal    Musculoskeletal: Normal range of motion  Neurological: She is alert and oriented to person, place, and time  Skin: Skin is warm and dry  Psychiatric: She has a normal mood and affect   Her behavior is normal          Additional Data:     Labs:      Results from last 7 days  Lab Units 18  7438 07/31/18  2305   WBC Thousand/uL 9 15 9 28   HEMOGLOBIN g/dL 12 0 12 5   HEMATOCRIT % 35 0 36 6   PLATELETS Thousands/uL 343 357   NEUTROS PCT %  --  46   LYMPHS PCT %  --  41   MONOS PCT %  --  8   EOS PCT %  --  4       Results from last 7 days  Lab Units 08/01/18  1055 08/01/18  0515   SODIUM mmol/L 133* 133*   POTASSIUM mmol/L  --  3 5   CHLORIDE mmol/L  --  99*   CO2 mmol/L  --  28   BUN mg/dL  --  12   CREATININE mg/dL  --  0 67   CALCIUM mg/dL  --  9 0   TOTAL PROTEIN g/dL  --  6 5   BILIRUBIN TOTAL mg/dL  --  0 20   ALK PHOS U/L  --  60   ALT U/L  --  11*   AST U/L  --  5   GLUCOSE RANDOM mg/dL  --  348*           Results from last 7 days  Lab Units 08/02/18  1606 08/02/18  1047 08/02/18  0609 08/01/18  2354 08/01/18  2057 08/01/18  1640 08/01/18  1051 08/01/18  0624 08/01/18  0149   POC GLUCOSE mg/dl 236* 333* 276* 370* 468* 341* 312* 324* 441*       Results from last 7 days  Lab Units 07/31/18  2305   HEMOGLOBIN A1C % 13 9*         * I Have Reviewed All Lab Data Listed Above  * Additional Pertinent Lab Tests Reviewed: All Labs Within Last 24 Hours Reviewed        Recent Cultures (last 7 days):           Last 24 Hours Medication List:     Current Facility-Administered Medications:  acetaminophen 650 mg Oral Q6H PRN Sohan Mccray MD    insulin glargine 20 Units Subcutaneous HS Latasha Davidson DO    insulin lispro 1-5 Units Subcutaneous 4x Daily (AC & HS) Sohan Mccray MD    insulin lispro 7 Units Subcutaneous TID With Meals Latasha Davidson DO    ondansetron 4 mg Intravenous Q6H PRN Sohan Mccray MD    sodium chloride 75 mL/hr Intravenous Continuous BRITTNY Llamas Last Rate: 75 mL/hr (08/02/18 1556)        Today, Patient Was Seen By: BRITTNY Llamas    ** Please Note: Dictation voice to text software may have been used in the creation of this document   **

## 2018-08-03 VITALS
DIASTOLIC BLOOD PRESSURE: 55 MMHG | WEIGHT: 125 LBS | SYSTOLIC BLOOD PRESSURE: 107 MMHG | HEIGHT: 66 IN | HEART RATE: 65 BPM | BODY MASS INDEX: 20.09 KG/M2 | TEMPERATURE: 97.8 F | OXYGEN SATURATION: 99 % | RESPIRATION RATE: 18 BRPM

## 2018-08-03 DIAGNOSIS — O20.0 THREATENED MISCARRIAGE: Primary | ICD-10-CM

## 2018-08-03 PROBLEM — G43.909 MIGRAINE: Status: ACTIVE | Noted: 2018-08-03

## 2018-08-03 LAB
B-HCG SERPL-ACNC: 12 MIU/ML
GLUCOSE SERPL-MCNC: 173 MG/DL (ref 65–140)
GLUCOSE SERPL-MCNC: 191 MG/DL (ref 65–140)

## 2018-08-03 PROCEDURE — 82948 REAGENT STRIP/BLOOD GLUCOSE: CPT

## 2018-08-03 PROCEDURE — 99239 HOSP IP/OBS DSCHRG MGMT >30: CPT | Performed by: NURSE PRACTITIONER

## 2018-08-03 PROCEDURE — 84702 CHORIONIC GONADOTROPIN TEST: CPT | Performed by: OBSTETRICS & GYNECOLOGY

## 2018-08-03 PROCEDURE — 86341 ISLET CELL ANTIBODY: CPT | Performed by: INTERNAL MEDICINE

## 2018-08-03 PROCEDURE — 83519 RIA NONANTIBODY: CPT | Performed by: INTERNAL MEDICINE

## 2018-08-03 PROCEDURE — 84681 ASSAY OF C-PEPTIDE: CPT | Performed by: INTERNAL MEDICINE

## 2018-08-03 RX ORDER — LEVONORGESTREL AND ETHINYL ESTRADIOL 0.1-0.02MG
1 KIT ORAL DAILY
Qty: 28 TABLET | Refills: 11 | Status: SHIPPED | OUTPATIENT
Start: 2018-08-03 | End: 2018-10-03 | Stop reason: SDUPTHER

## 2018-08-03 RX ORDER — BUTALBITAL, ACETAMINOPHEN AND CAFFEINE 50; 325; 40 MG/1; MG/1; MG/1
1 TABLET ORAL EVERY 6 HOURS PRN
Qty: 30 TABLET | Refills: 0 | Status: SHIPPED | OUTPATIENT
Start: 2018-08-03

## 2018-08-03 RX ORDER — INSULIN GLARGINE 100 [IU]/ML
20 INJECTION, SOLUTION SUBCUTANEOUS
Qty: 10 ML | Refills: 0 | Status: SHIPPED | OUTPATIENT
Start: 2018-08-03 | End: 2018-08-03

## 2018-08-03 RX ORDER — INSULIN GLARGINE 100 [IU]/ML
20 INJECTION, SOLUTION SUBCUTANEOUS
Qty: 10 ML | Refills: 0 | Status: SHIPPED | OUTPATIENT
Start: 2018-08-03 | End: 2018-10-02

## 2018-08-03 RX ADMIN — INSULIN LISPRO 1 UNITS: 100 INJECTION, SOLUTION INTRAVENOUS; SUBCUTANEOUS at 08:00

## 2018-08-03 RX ADMIN — INSULIN LISPRO 7 UNITS: 100 INJECTION, SOLUTION INTRAVENOUS; SUBCUTANEOUS at 08:21

## 2018-08-03 RX ADMIN — BUTALBITAL, ACETAMINOPHEN, AND CAFFEINE 1 TABLET: 50; 325; 40 TABLET ORAL at 10:03

## 2018-08-03 RX ADMIN — INSULIN LISPRO 1 UNITS: 100 INJECTION, SOLUTION INTRAVENOUS; SUBCUTANEOUS at 12:00

## 2018-08-03 NOTE — PROGRESS NOTES
Reviewed blood sugars over past 24 hr   Blood sugar is better this morning  Patient can be discharged from endocrine standpoint  See my consult from yesterday for additional discharge recommendations      Recent Results (from the past 24 hour(s))   Fingerstick Glucose (POCT)    Collection Time: 08/02/18 10:47 AM   Result Value Ref Range    POC Glucose 333 (H) 65 - 140 mg/dl   Fingerstick Glucose (POCT)    Collection Time: 08/02/18  4:06 PM   Result Value Ref Range    POC Glucose 236 (H) 65 - 140 mg/dl   Fingerstick Glucose (POCT)    Collection Time: 08/02/18  9:29 PM   Result Value Ref Range    POC Glucose 369 (H) 65 - 140 mg/dl   Fingerstick Glucose (POCT)    Collection Time: 08/03/18  6:30 AM   Result Value Ref Range    POC Glucose 191 (H) 65 - 140 mg/dl

## 2018-08-03 NOTE — ASSESSMENT & PLAN NOTE
· HbA1c 13 9   · Patient evaluated by endocrinology  · Lantus increased to 20 U subcu at HS  · Sliding scale and 7 U Humalog with each meal   · Patient's blood glucose overall clinically improved will decrease patient's mealtime insulin to 5 units with meals continue the sliding scale given patient is now in the 100s  Patient to follow up with endocrine as an outpatient   · Noted PMH: PCOS and metabolic syndrome was on metformin but discontinued it due to intolerability of side effects  · Patient should clinically display her ability to give herself insulin and glucometer education  ·  Lipid panel reviewed LDL 96, triglycerides 54 total cholesterol 168    · May shower  · Nutritional consult for diabetic diet, newly diagnosed Diabetes Mellitus

## 2018-08-03 NOTE — DISCHARGE INSTRUCTIONS
Sliding scale for home Before Breakfast Before Lunch Before Evening Meal Bedtime               Extra fast acting insulin   <80 0 0 0 0    0 0 0 0   151-200 1 1 1 0   201-250 2 2 2 0   251-300 3 3 3 0   301-350 4 4 4 0   >350 5 5 5 0

## 2018-08-03 NOTE — ASSESSMENT & PLAN NOTE
· States she passed a large blood clot a few days ago while attempting to urinate and noted that her belly distention over the last several weeks along with breast size have decreased - she noted her symptoms were similar to her previous miscarriage at 12 weeks gestation approximately 1 year ago  · Plan per OBGYN Dr Shreya Johnson trend HCG levels  · Outpatient f/u with gyn to start on OC added to d/c summary

## 2018-08-03 NOTE — PLAN OF CARE
METABOLIC, FLUID AND ELECTROLYTES - ADULT     Electrolytes maintained within normal limits Progressing     Fluid balance maintained Progressing     Glucose maintained within target range Progressing          Knowledge Deficit     Patient/family/caregiver demonstrates understanding of disease process, treatment plan, medications, and discharge instructions Progressing          PAIN - ADULT     Verbalizes/displays adequate comfort level or baseline comfort level Progressing          SAFETY ADULT     Patient will remain free of falls Progressing     Maintain or return to baseline ADL function Progressing     Maintain or return mobility status to optimal level Progressing          DISCHARGE PLANNING     Discharge to home or other facility with appropriate resources Progressing

## 2018-08-03 NOTE — DISCHARGE SUMMARY
Discharge- Connie Dumont 1990, 34 y o  female MRN: [id number]    Unit/Bed#: -01 Encounter: [id number]    Primary Care Provider: No primary care provider on file  Date and time admitted to hospital: 7/31/2018 10:55 PM        * Hyperglycemia - New onset diabetes mellitus   Assessment & Plan    · HbA1c 13 9   · Patient evaluated by endocrinology  · Lantus increased to 20 U subcu at HS  · Sliding scale and 7 U Humalog with each meal   · Patient's blood glucose overall clinically improved will decrease patient's mealtime insulin to 5 units with meals continue the sliding scale given patient is now in the 100s  Patient to follow up with endocrine as an outpatient   · Noted PMH: PCOS and metabolic syndrome was on metformin but discontinued it due to intolerability of side effects  · Patient should clinically display her ability to give herself insulin and glucometer education  ·  Lipid panel reviewed LDL 96, triglycerides 54 total cholesterol 168  · May shower  · Nutritional consult for diabetic diet, newly diagnosed Diabetes Mellitus            Hyponatremia   Assessment & Plan    · Resolved, likely due to hyperglycemia  · Continue hydration however will decrease rate to 75 mL an hours          Miscarriage   Assessment & Plan    · States she passed a large blood clot a few days ago while attempting to urinate and noted that her belly distention over the last several weeks along with breast size have decreased - she noted her symptoms were similar to her previous miscarriage at 12 weeks gestation approximately 1 year ago  · Plan per OBGYN Dr Otoniel Carson trend HCG levels  · Outpatient f/u with gyn to start on OC added to d/c summary  Discharging Physician / Practitioner: BRITTNY Lyon  PCP: No primary care provider on file    Admission Date:   Admission Orders     Ordered        07/31/18 1153  Inpatient Admission (expected length of stay for this patient is greater than two midnights)  Once         18 2349  Place in Observation (expected length of stay for this patient is less than two midnights)  Once             Discharge Date: 18    Resolved Problems  Date Reviewed: 2018    None          Consultations During Hospital Stay:  · Endocrinology  · OBGYN    Procedures Performed:     · Ultrasound OB pregnancy findings of no intrauterine gestation or adnexal mass identified  Differential remains early IUP, spontaneous , and ectopic pregnancy  · HCG quantitative 35 trended down 17  · Hemoglobin A1c 13 9    Significant Findings / Test Results:     · New onset diabetes  · Spontaneous miscarriage    Incidental Findings:   · None     Test Results Pending at Discharge (will require follow up):   · Quantitative HCG     Outpatient Tests Requested:  · Per endocrine/OBGYN    Complications:  New onset diabetes need for tighter blood glucose control prior to discharge    Reason for Admission:  New onset diabetes    Hospital Course:     Connie Dumont is a 34 y o  female patient who originally presented to the hospital on 2018 due to recommendation recent ED visit for spontaneous miscarriage revealed that patient had an elevated blood glucose of 627 and was informed to return to the ED  Patient does have a history of PCOS and has had previous blood glucose issues and was on metformin however this was only a trial in-patient discontinued it  Patient had any but global A1c that was noted to be 13 9 given patient's spontaneous miscarriage OB gyn was also consulted along with Endocrinology  The patient was trended with quantitative HCG levels as recommended by OBGYN which did continue to trend down prior to discharge  Patient given her elevated hemoglobin A1c in new onset diabetes endocrinology was consulted who recommended patient be discharged on mealtime insulin and sliding scale insulin plus 20 units of Lantus at bedtime and follow up with them as an outpatient  Patient's overall blood glucose continue to correct patient was in the the high 1 100s upon discharge her mealtime insulin was slightly decreased along with recommended outpatient follow-up with Endocrinology  Please see above list of diagnoses and related plan for additional information  Condition at Discharge: good     Discharge Day Visit / Exam:     Subjective:  Patient reports ongoing headache but does feel this is due to her correcting blood glucose and has been responding to Fioricet  Patient feels comfortable with her blood glucose correction and outpatient follow-up with Endocrinology and further outpatient follow-up with OBGYN given her spontaneous miscarriage  Vitals: Blood Pressure: 107/55 (08/03/18 0700)  Pulse: 65 (08/03/18 0700)  Temperature: 97 8 °F (36 6 °C) (08/03/18 0700)  Temp Source: Oral (08/03/18 0700)  Respirations: 18 (08/03/18 0700)  Height: 5' 6" (167 6 cm) (08/02/18 1217)  Weight - Scale: 56 7 kg (125 lb) (08/02/18 1216)  SpO2: 99 % (08/03/18 0700)  Exam:   Physical Exam   Constitutional: She is oriented to person, place, and time  She appears well-developed and well-nourished  HENT:   Head: Normocephalic and atraumatic  Eyes: Conjunctivae and EOM are normal    Neck: Normal range of motion  Cardiovascular: Normal rate, regular rhythm and normal heart sounds  Pulmonary/Chest: Effort normal and breath sounds normal    Abdominal: Soft  Bowel sounds are normal    Musculoskeletal: Normal range of motion  Neurological: She is alert and oriented to person, place, and time  Skin: Skin is warm and dry  Psychiatric: She has a normal mood and affect  Her behavior is normal          Discharge instructions/Information to patient and family:   See after visit summary for information provided to patient and family  Provisions for Follow-Up Care:  See after visit summary for information related to follow-up care and any pertinent home health orders        Disposition: Home    For Discharges to [facility]:   · Not Applicable to this Patient - Not Applicable to this Patient    Planned Readmission:  None     Discharge Statement:  I spent 40 minutes discharging the patient  This time was spent on the day of discharge  I had direct contact with the patient on the day of discharge  Greater than 50% of the total time was spent examining patient, answering all patient questions, arranging and discussing plan of care with patient as well as directly providing post-discharge instructions  Additional time then spent on discharge activities  Discharge Medications:  See after visit summary for reconciled discharge medications provided to patient and family        ** Please Note: This note has been constructed using a voice recognition system **

## 2018-08-04 LAB — C PEPTIDE SERPL-MCNC: 0.7 NG/ML (ref 1.1–4.4)

## 2018-08-06 LAB — PANC ISLET CELL AB TITR SER: NEGATIVE {TITER}

## 2018-08-07 LAB — GAD65 AB SER-ACNC: 17.5 U/ML (ref 0–5)

## 2018-08-20 ENCOUNTER — OFFICE VISIT (OUTPATIENT)
Dept: OBGYN CLINIC | Age: 28
End: 2018-08-20
Payer: COMMERCIAL

## 2018-08-20 VITALS — HEIGHT: 66 IN | DIASTOLIC BLOOD PRESSURE: 70 MMHG | SYSTOLIC BLOOD PRESSURE: 120 MMHG

## 2018-08-20 DIAGNOSIS — Z30.09 CONTRACEPTIVE EDUCATION: ICD-10-CM

## 2018-08-20 DIAGNOSIS — O03.9 MISCARRIAGE: Primary | ICD-10-CM

## 2018-08-20 PROCEDURE — 99213 OFFICE O/P EST LOW 20 MIN: CPT | Performed by: OBSTETRICS & GYNECOLOGY

## 2018-08-20 NOTE — PROGRESS NOTES
Assessment/Plan:    Miscarriage  Bleeding has resolved    Contraception started  Recommend she does not attempt pregnancy until her diabetes is controlled  HgBA1C needs to be below 6 and she has to be given the approval from endocrinologist              Diagnoses and all orders for this visit:    Miscarriage    Contraceptive education          Subjective:      Patient ID: Darrel Degroot is a 34 y o  female  Patient here for new patient follow up from ER for miscarriage  Patient states she is no longer bleeding & has started aviane birth control pills  Consult in hospital for miscarriage  At that time discussed OC use to treat PCOS, allow time to heal from miscarriage, allow for time to control IDDM  Patient has started OC- has experienced acne and feels her mood is inconsistent  Discussed typical symptoms with OC start, hormonal adjustments, usually most symptoms resolve by third pill pack  If at that time side effects are intolerable then can switch pill  Declines IUD, nexplanon, depoprovera  , nuvaring  Will only take pill due to stories from family and friends about the other methods  The following portions of the patient's history were reviewed and updated as appropriate:   She  has a past medical history of Asthma; Diabetes mellitus ([address] ); Metabolic syndrome; and PCOS (polycystic ovarian syndrome)  She   Patient Active Problem List    Diagnosis Date Noted    Migraine 08/03/2018    Hyperglycemia - New onset diabetes mellitus 08/01/2018    Hyponatremia 08/01/2018    Miscarriage 08/01/2018     She  has no past surgical history on file  Her family history includes Fibroids in her mother  She  reports that she has never smoked  She has never used smokeless tobacco  She reports that she does not drink alcohol or use drugs    Current Outpatient Prescriptions   Medication Sig Dispense Refill    butalbital-acetaminophen-caffeine (FIORICET,ESGIC) -40 mg per tablet Take 1 tablet by mouth every 6 (six) hours as needed for headaches or migraine 30 tablet 0    insulin glargine (LANTUS) 100 units/mL subcutaneous injection Inject 20 Units under the skin daily at bedtime for 60 days 10 mL 0    insulin lispro (HumaLOG) 100 units/mL injection Inject 5 Units under the skin 3 (three) times a day with meals for 60 days 3 mL 2    insulin lispro (HumaLOG) 100 units/mL injection Inject 1-5 Units under the skin 3 (three) times a day before meals 3 mL 2    levonorgestrel-ethinyl estradiol (AVIANE,ALESSE,LESSINA) 0 1-20 MG-MCG per tablet Take 1 tablet by mouth daily 28 tablet 11     No current facility-administered medications for this visit  Current Outpatient Prescriptions on File Prior to Visit   Medication Sig    butalbital-acetaminophen-caffeine (FIORICET,ESGIC) -40 mg per tablet Take 1 tablet by mouth every 6 (six) hours as needed for headaches or migraine    insulin glargine (LANTUS) 100 units/mL subcutaneous injection Inject 20 Units under the skin daily at bedtime for 60 days    insulin lispro (HumaLOG) 100 units/mL injection Inject 5 Units under the skin 3 (three) times a day with meals for 60 days    insulin lispro (HumaLOG) 100 units/mL injection Inject 1-5 Units under the skin 3 (three) times a day before meals    levonorgestrel-ethinyl estradiol (AVIANE,ALESSE,LESSINA) 0 1-20 MG-MCG per tablet Take 1 tablet by mouth daily     No current facility-administered medications on file prior to visit  She has No Known Allergies       Review of Systems   Constitutional: Negative for activity change, appetite change, chills, fatigue and fever  HENT: Negative for rhinorrhea, sneezing and sore throat  Eyes: Negative for visual disturbance  Respiratory: Negative for cough, shortness of breath and wheezing  Cardiovascular: Negative for chest pain, palpitations and leg swelling  Gastrointestinal: Negative for abdominal distention, constipation, diarrhea, nausea and vomiting  Genitourinary: Negative for difficulty urinating  Neurological: Negative for syncope and light-headedness  Objective:      /70 (BP Location: Left arm, Patient Position: Sitting, Cuff Size: Standard)   Ht 5' 6" (1 676 m)   Breastfeeding?  Unknown          Physical Exam

## 2018-08-22 NOTE — ASSESSMENT & PLAN NOTE
Bleeding has resolved    Contraception started  Recommend she does not attempt pregnancy until her diabetes is controlled    HgBA1C needs to be below 6 and she has to be given the approval from endocrinologist

## 2018-08-22 NOTE — PATIENT INSTRUCTIONS
Birth Control Pills   WHAT YOU NEED TO KNOW:   What are birth control pills? Birth control pills are also called oral contraceptives, or the pill  It is medicine that helps prevent pregnancy  Birth control pills work by preventing ovulation  Ovulation is when the ovaries make and release an egg cell each month  If this egg gets fertilized by sperm, pregnancy occurs  Birth control pills may also help to prevent pregnancy by keeping sperm from fertilizing an egg  What may be done before I can start taking birth control pills? You need to see your healthcare provider to get a prescription  Any of the following may be done before your healthcare provider gives you a prescription:  · Your healthcare provider will ask you about diseases and illnesses you have had in the past  He will check your risk for blood clots, heart conditions, or stroke  He will also check your blood pressure, and may do a breast and pelvic exam  A Pap smear may also be done during the pelvic exam  This is a test to make sure you do not have abnormal changes on your cervix  You may need other tests, such as a urine test, to make sure you are not pregnant  · Your healthcare provider will ask if you take any medicines and if you smoke  Smoking increases your risk for stroke, heart attack, or a blood clot in your lungs  If you smoke, you should not take certain kinds of birth control pills  What are the advantages of birth control pills? When birth control pills are used correctly, the chances of getting pregnant are very low  Birth control pills may help decrease bleeding and pain during your monthly period  They may also help prevent cancer of the uterus and ovaries  What are the disadvantages of birth control pills? You may have sudden changes in your mood or feelings while you take birth control pills  You may have nausea and decreased sex drive  You may have an increased appetite and rapid weight gain   You may also have bleeding in between periods, less frequent periods, vaginal dryness, and breast pain  Birth control pills will not protect you from sexually transmitted infections  Rarely, some birth control pills can increase your risk for a blood clot  This may become life-threatening  What should I do if I decide I want to get pregnant? If you are planning to have a baby, ask your healthcare provider when you may stop taking your birth control pills  It may take some time for you to start ovulating again  Ask your healthcare provider for more information about pregnancy after birth control pills  When should I start taking birth control pills after I have a baby? If you are not breastfeeding, you may start taking birth control pills 3 weeks after you give birth  You may be able to take certain types of birth control pills if you are breastfeeding  These pills can be started from 6 weeks to 6 months after you give birth  Ask your healthcare provider for more information about when to start taking birth control pills after you give birth  When should I contact my healthcare provider? · You have forgotten to take a birth control pill  · You have mood changes, such as depression, since starting birth control pills  · You have nausea or you are vomiting  · You have severe abdominal pain  · You missed a period and have questions or concerns about being pregnant  · You still have bleeding 4 months after taking birth control pills correctly  · You have questions or concerns about your condition or care  When should I seek immediate care or call 911? · Your arm or leg feels warm, tender, and painful  It may look swollen and red  · You feel lightheaded, short of breath, and have chest pain  · You cough up blood      · You have any of the following signs of a stroke:      ¨ Numbness or drooping on one side of your face     ¨ Weakness in an arm or leg    ¨ Confusion or difficulty speaking    ¨ Dizziness, a severe headache, or vision loss    · You have severe pain, numbness, or swelling in your arms or legs  CARE AGREEMENT:   You have the right to help plan your care  Learn about your health condition and how it may be treated  Discuss treatment options with your caregivers to decide what care you want to receive  You always have the right to refuse treatment  The above information is an  only  It is not intended as medical advice for individual conditions or treatments  Talk to your doctor, nurse or pharmacist before following any medical regimen to see if it is safe and effective for you  © 2017 [facility] Information is for End User's use only and may not be sold, redistributed or otherwise used for commercial purposes  All illustrations and images included in CareNotes® are the copyrighted property of [employer]  or Sorin Sadler

## 2018-10-03 DIAGNOSIS — IMO0001 BIRTH CONTROL: Primary | ICD-10-CM

## 2018-10-03 DIAGNOSIS — O03.9 MISCARRIAGE: ICD-10-CM

## 2018-10-03 RX ORDER — LEVONORGESTREL AND ETHINYL ESTRADIOL 0.1-0.02MG
1 KIT ORAL DAILY
Qty: 28 TABLET | Refills: 10 | Status: SHIPPED | OUTPATIENT
Start: 2018-10-03

## 2018-10-03 NOTE — TELEPHONE ENCOUNTER
Pt needs refills on her B/C pills sent to a new Pharmacy    The Pharmacy is [facility] in [place], [place]

## 2018-11-27 ENCOUNTER — TELEPHONE (OUTPATIENT)
Dept: OBGYN CLINIC | Age: 28
End: 2018-11-27

## 2018-11-27 NOTE — TELEPHONE ENCOUNTER
Patient is calling because she stating the pharmacy never got the refill request from October can we send that again to [address]